# Patient Record
Sex: FEMALE | Race: OTHER | HISPANIC OR LATINO | Employment: UNEMPLOYED | URBAN - METROPOLITAN AREA
[De-identification: names, ages, dates, MRNs, and addresses within clinical notes are randomized per-mention and may not be internally consistent; named-entity substitution may affect disease eponyms.]

---

## 2021-08-11 ENCOUNTER — OFFICE VISIT (OUTPATIENT)
Dept: CARDIOLOGY CLINIC | Facility: CLINIC | Age: 61
End: 2021-08-11
Payer: COMMERCIAL

## 2021-08-11 VITALS
SYSTOLIC BLOOD PRESSURE: 124 MMHG | WEIGHT: 127 LBS | DIASTOLIC BLOOD PRESSURE: 60 MMHG | HEART RATE: 70 BPM | BODY MASS INDEX: 23.37 KG/M2 | HEIGHT: 62 IN

## 2021-08-11 DIAGNOSIS — M25.512 LEFT SHOULDER PAIN, UNSPECIFIED CHRONICITY: ICD-10-CM

## 2021-08-11 DIAGNOSIS — E78.5 DYSLIPIDEMIA: ICD-10-CM

## 2021-08-11 DIAGNOSIS — E11.9 TYPE 2 DIABETES MELLITUS WITHOUT COMPLICATION, WITHOUT LONG-TERM CURRENT USE OF INSULIN (HCC): ICD-10-CM

## 2021-08-11 DIAGNOSIS — I25.10 CORONARY ARTERY DISEASE INVOLVING NATIVE CORONARY ARTERY OF NATIVE HEART WITHOUT ANGINA PECTORIS: Primary | ICD-10-CM

## 2021-08-11 DIAGNOSIS — I10 ESSENTIAL HYPERTENSION, BENIGN: ICD-10-CM

## 2021-08-11 PROCEDURE — 93000 ELECTROCARDIOGRAM COMPLETE: CPT | Performed by: INTERNAL MEDICINE

## 2021-08-11 PROCEDURE — 99244 OFF/OP CNSLTJ NEW/EST MOD 40: CPT | Performed by: INTERNAL MEDICINE

## 2021-08-11 RX ORDER — LISINOPRIL 5 MG/1
5 TABLET ORAL DAILY
COMMUNITY

## 2021-08-11 RX ORDER — METOPROLOL SUCCINATE 25 MG/1
25 TABLET, EXTENDED RELEASE ORAL DAILY
COMMUNITY

## 2021-08-11 RX ORDER — LEVOTHYROXINE SODIUM 0.03 MG/1
25 TABLET ORAL DAILY
COMMUNITY

## 2021-08-11 RX ORDER — METHOCARBAMOL 500 MG/1
500 TABLET, FILM COATED ORAL 4 TIMES DAILY PRN
COMMUNITY
Start: 2021-05-10

## 2021-08-11 RX ORDER — OMEPRAZOLE 20 MG/1
20 CAPSULE, DELAYED RELEASE ORAL DAILY
COMMUNITY

## 2021-08-11 RX ORDER — GEMFIBROZIL 600 MG/1
600 TABLET, FILM COATED ORAL 2 TIMES DAILY
COMMUNITY
End: 2021-09-28

## 2021-08-11 RX ORDER — ASPIRIN 81 MG/1
81 TABLET ORAL DAILY
COMMUNITY

## 2021-08-11 RX ORDER — CLOPIDOGREL BISULFATE 75 MG/1
75 TABLET ORAL DAILY
COMMUNITY

## 2021-08-11 RX ORDER — GABAPENTIN 400 MG/1
400 CAPSULE ORAL 3 TIMES DAILY
COMMUNITY

## 2021-08-11 RX ORDER — SIMVASTATIN 40 MG
40 TABLET ORAL
COMMUNITY
End: 2021-09-28

## 2021-08-11 RX ORDER — ISOSORBIDE DINITRATE 30 MG/1
30 TABLET ORAL 3 TIMES DAILY
COMMUNITY

## 2021-08-11 RX ORDER — INSULIN LISPRO 100 [IU]/ML
10 INJECTION, SOLUTION INTRAVENOUS; SUBCUTANEOUS
COMMUNITY

## 2021-08-11 NOTE — PROGRESS NOTES
Cardiology Office Note  MD Pita Ba MD Frazier Merle, DO, [facility] MD Ben Soto DO, Ezekiel Chambers DO, [facility] [place]  ----------------------------------------------------------------  [address]  [address], [address]    Shabbir Given 61 y o  female MRN: [id number]  Unit/Bed#:  Encounter: [id number]      History of Present Illness: It was a pleasure to see Shabbir Irizarry in the office today for initial CV evaluation  She has a past medical history CAD with prior MI in [facility] and multiple stents in 2011, 2014 in 2017, dyslipidemia and type 2 diabetes mellitus  She established care with us in August 2021  She has prior myocardial infarction that occurred while she was in [facility]   At that time, she has had stents placed  Over the years, she has had several other stents placed  She states that she initially had symptoms of discomfort in her left chest wall radiating down her left arm  The symptoms were fairly significant and she was taken to the emergency department in [facility] where she was found to have myocardial infarction of the circumflex  Following the as initial stents that were placed, she has also had additional stents placed to the LAD with recurrent stents placed to the circumflex  She is now here today to establish care number reports that she is asymptomatic  She does admit to some left shoulder pain  She is unclear as to whether not this was similar pain to what she has had in the past   Denies lightheadedness, dizziness or palpitations  Denies lower extremity swelling, orthopnea paroxysmal nocturnal dyspnea  Review of Systems:  Review of Systems   Constitutional: Negative for decreased appetite, fever, weight gain and weight loss  HENT: Negative for congestion and sore throat  Eyes: Negative for visual disturbance  Cardiovascular: Positive for chest pain   Negative for dyspnea on exertion, leg swelling, near-syncope and palpitations  Respiratory: Negative for cough and shortness of breath  Hematologic/Lymphatic: Negative for bleeding problem  Skin: Negative for rash  Musculoskeletal: Negative for myalgias and neck pain  Gastrointestinal: Negative for abdominal pain and nausea  Neurological: Negative for light-headedness and weakness  Psychiatric/Behavioral: Negative for depression  Past Medical History:   Diagnosis Date    Coronary artery disease involving native coronary artery of native heart without angina pectoris     Dyslipidemia     Essential hypertension     MI (myocardial infarction) ([place] [address] )     Type 2 diabetes mellitus ([facility] )        Past Surgical History:   Procedure Laterality Date    CARDIAC CATHETERIZATION Left     Multiple catheterizations in , 2014 in 2017 in [facility] History     Socioeconomic History    Marital status: Single     Spouse name: None    Number of children: None    Years of education: None    Highest education level: None   Occupational History    None   Tobacco Use    Smoking status: Former Smoker     Types: Cigarettes     Quit date: 2001     Years since quittin 0    Smokeless tobacco: Never Used   Vaping Use    Vaping Use: Never used   Substance and Sexual Activity    Alcohol use: Never    Drug use: Never    Sexual activity: None   Other Topics Concern    None   Social History Narrative    None     Social Determinants of Health     Financial Resource Strain:     Difficulty of Paying Living Expenses:    Food Insecurity:     Worried About Running Out of Food in the Last Year:     [address] in the Last Year:    Transportation Needs:     Lack of Transportation (Medical):      Lack of Transportation (Non-Medical):    Physical Activity:     Days of Exercise per Week:     Minutes of Exercise per Session:    Stress:     Feeling of Stress :    Social Connections:     Frequency of Communication with Friends and Family:     Frequency of Social Gatherings with Friends and Family:     Attends [employer] Services:     Active Member of Clubs or Organizations:     Attends Club or Organization Meetings:     Marital Status:    Intimate Partner Violence:     Fear of Current or Ex-Partner:     Emotionally Abused:     Physically Abused:     Sexually Abused:        History reviewed  No pertinent family history      No Known Allergies      Current Outpatient Medications:     aspirin (ECOTRIN LOW STRENGTH) 81 mg EC tablet, Take 81 mg by mouth daily, Disp: , Rfl:     clopidogrel (PLAVIX) 75 mg tablet, Take 75 mg by mouth daily, Disp: , Rfl:     gabapentin (NEURONTIN) 400 mg capsule, Take 400 mg by mouth Three times a day, Disp: , Rfl:     gemfibrozil (LOPID) 600 mg tablet, Take 600 mg by mouth 2 (two) times a day, Disp: , Rfl:     insulin glargine (LANTUS SOLOSTAR) 100 units/mL injection pen, Inject 32 Units under the skin daily, Disp: , Rfl:     insulin lispro (HumaLOG) 100 units/mL injection pen, Inject under the skin, Disp: , Rfl:     isosorbide dinitrate (ISORDIL) 30 mg tablet, Take 30 mg by mouth Three times a day, Disp: , Rfl:     levothyroxine 25 mcg tablet, Take 25 mcg by mouth daily, Disp: , Rfl:     lisinopril (ZESTRIL) 5 mg tablet, Take 5 mg by mouth daily, Disp: , Rfl:     methocarbamol (ROBAXIN) 500 mg tablet, Take 500 mg by mouth 4 (four) times a day as needed, Disp: , Rfl:     metoprolol succinate (TOPROL-XL) 25 mg 24 hr tablet, Take 25 mg by mouth daily, Disp: , Rfl:     omeprazole (PriLOSEC) 20 mg delayed release capsule, Take 20 mg by mouth daily, Disp: , Rfl:     simvastatin (ZOCOR) 40 mg tablet, Take 40 mg by mouth, Disp: , Rfl:     Vitals:    08/11/21 1029   BP: 124/60   Pulse: 70   Weight: 57 6 kg (127 lb)   Height: 5' 2" (1 575 m)       PHYSICAL EXAMINATION:  Gen: Awake, Alert, NAD   Head/eyes: AT/NC, pupils equal and round, Anicteric  ENT: mmm  Neck: Supple, No elevated JVP, trachea midline  Resp: CTA bilaterally no w/r/r  CV: RRR +S1, S2, No m/r/g  Abd: Soft, NT/ND + BS  Ext: no LE edema bilaterally  Neuro: Follows commands, moves all extermities  Psych: Appropriate affect, normal mood, pleasant attitude, non-combative  Skin: warm; no rash, erythema or venous stasis changes on exposed skin    --------------------------------------------------------------------------------  TREADMILL STRESS  No results found for this or any previous visit      --------------------------------------------------------------------------------  NUCLEAR STRESS TEST: No results found for this or any previous visit  No results found for this or any previous visit       --------------------------------------------------------------------------------  CATH:  No results found for this or any previous visit     --------------------------------------------------------------------------------  ECHO:   No results found for this or any previous visit  No results found for this or any previous visit     --------------------------------------------------------------------------------  HOLTER  No results found for this or any previous visit      No results found for this or any previous visit     --------------------------------------------------------------------------------  CAROTIDS  No results found for this or any previous visit      --------------------------------------------------------------------------------  ECGs:  Results for orders placed or performed in visit on 08/11/21   POCT ECG    Impression    Sinus rhythm 70 beats per minute, normal ECG        No results found for: WBC, HGB, HCT, MCV, PLT   No results found for: SODIUM, K, CL, CO2, BUN, CREATININE, GLUC, CALCIUM   No results found for: HGBA1C   No results found for: CHOL  No results found for: HDL  No results found for: LDLCALC  No results found for: TRIG  No results found for: CHOLHDL   No results found for: INR, PROTIME 1  Coronary artery disease involving native coronary artery of native heart without angina pectoris  -     POCT ECG  -     Echo complete with contrast if indicated; Future; Expected date: 08/11/2021  -     NM myocardial perfusion spect (stress and/or rest); Future; Expected date: 08/11/2021  -     Lipid Panel with Direct LDL reflex; Future    2  Left shoulder pain, unspecified chronicity    3  Essential hypertension, benign  -     Echo complete with contrast if indicated; Future; Expected date: 08/11/2021    4  Dyslipidemia    5  Type 2 diabetes mellitus without complication, without long-term current use of insulin ([facility] )        IMPRESSION:  · Left shoulder/ left upper chest discomfort  · CAD/MI in [place]  · ZAC-mLCx, dLCx and mLAD, June 2011  · ZAC-dLCx, February 2014  · ZAC-pLAD, October 2017  · ZAC-LCx, November 2017  · Hypertension   · Dyslipidemia  · Type 2 diabetes mellitus    PLAN:  It was a pleasure to see Nando Rico in the office today for initial CV evaluation  She is here today to establish care with her known history of MI and CAD and multiple stents  She has had multiple stents to both her LAD and circumflex over many years  She admits to some left shoulder discomfort which I believe to be musculoskeletal in nature  She does state that there was some overlap between her prior discomfort, but previously there was some exertional component  She has no signs or symptoms of heart failure and examines to be euvolemic  ECG is nonischemic  Blood pressure and heart rate are currently stable  Based on her clinical presentation, I have the following recommendations:    1  Recommend checking exercise nuclear stress test to assess for any evidence of underlying myocardial ischemia  I have instructed the patient to hold her metoprolol on the day of testing  2  We will obtain a 2D echocardiogram to assess her cardiac structure and function with longstanding history of hypertension and CAD  3   Will attempt to obtain records from [place]   Patient has records at home and will bring the min at our follow-up encounter  4  Check lipid panel  Goal LDL is less than 70 mg/dL  She is currently on Zocor  Plan to change to atorvastatin at our next encounter  Continue gemfibrozil  5  Continue lifelong aspirin would maintain on Plavix for now, but will consider changing to Xarelto 2 5 mg b i d  Long-term  6  Continue metoprolol, lisinopril and isosorbide for blood pressure control  7  Should her symptoms worsen in frequency or severity or change in quality, I recommend she seek immediate medical attention  Recommend heart healthy diet low in sodium  8  We will follow up with her after testing  As always, please do not hesitate to call with any questions  Portions of the record may have been created with voice recognition software  Occasional wrong word or "sound a like" substitutions may have occurred due to the inherent limitations of voice recognition software  Read the chart carefully and recognize, using context, where substitutions have occurred        Signed: Vicky King DO, [facility]

## 2021-09-11 ENCOUNTER — LAB (OUTPATIENT)
Dept: LAB | Facility: HOSPITAL | Age: 61
End: 2021-09-11
Attending: INTERNAL MEDICINE
Payer: COMMERCIAL

## 2021-09-11 DIAGNOSIS — I25.10 CORONARY ARTERY DISEASE INVOLVING NATIVE CORONARY ARTERY OF NATIVE HEART WITHOUT ANGINA PECTORIS: ICD-10-CM

## 2021-09-11 LAB
CHOLEST SERPL-MCNC: 219 MG/DL (ref 50–200)
HDLC SERPL-MCNC: 55 MG/DL
LDLC SERPL CALC-MCNC: 144 MG/DL (ref 0–100)
TRIGL SERPL-MCNC: 100 MG/DL

## 2021-09-11 PROCEDURE — 80061 LIPID PANEL: CPT

## 2021-09-11 PROCEDURE — 36415 COLL VENOUS BLD VENIPUNCTURE: CPT

## 2021-09-16 ENCOUNTER — HOSPITAL ENCOUNTER (OUTPATIENT)
Dept: NON INVASIVE DIAGNOSTICS | Facility: HOSPITAL | Age: 61
Discharge: HOME | End: 2021-09-16
Attending: INTERNAL MEDICINE
Payer: COMMERCIAL

## 2021-09-16 ENCOUNTER — HOSPITAL ENCOUNTER (OUTPATIENT)
Dept: NUCLEAR MEDICINE | Facility: HOSPITAL | Age: 61
Discharge: HOME | End: 2021-09-16
Attending: INTERNAL MEDICINE
Payer: COMMERCIAL

## 2021-09-16 DIAGNOSIS — I10 ESSENTIAL HYPERTENSION, BENIGN: ICD-10-CM

## 2021-09-16 DIAGNOSIS — I25.10 CORONARY ARTERY DISEASE INVOLVING NATIVE CORONARY ARTERY OF NATIVE HEART WITHOUT ANGINA PECTORIS: ICD-10-CM

## 2021-09-16 PROCEDURE — 93017 CV STRESS TEST TRACING ONLY: CPT

## 2021-09-16 PROCEDURE — 93306 TTE W/DOPPLER COMPLETE: CPT

## 2021-09-16 PROCEDURE — 93016 CV STRESS TEST SUPVJ ONLY: CPT

## 2021-09-16 PROCEDURE — 93018 CV STRESS TEST I&R ONLY: CPT

## 2021-09-16 PROCEDURE — A9502 TC99M TETROFOSMIN: HCPCS

## 2021-09-16 PROCEDURE — 78452 HT MUSCLE IMAGE SPECT MULT: CPT

## 2021-09-16 PROCEDURE — 93306 TTE W/DOPPLER COMPLETE: CPT | Performed by: INTERNAL MEDICINE

## 2021-09-16 RX ADMIN — REGADENOSON 0.4 MG: 0.08 INJECTION, SOLUTION INTRAVENOUS at 10:55

## 2021-09-17 LAB
CHEST PAIN STATEMENT: NORMAL
MAX DIASTOLIC BP: 80 MMHG
MAX HEART RATE: 118 BPM
MAX PREDICTED HEART RATE: 160 BPM
MAX. SYSTOLIC BP: 140 MMHG
PROTOCOL NAME: NORMAL
REASON FOR TERMINATION: NORMAL
TARGET HR FORMULA: NORMAL
TIME IN EXERCISE PHASE: NORMAL

## 2021-09-18 ENCOUNTER — TELEPHONE (OUTPATIENT)
Dept: NON INVASIVE DIAGNOSTICS | Facility: HOSPITAL | Age: 61
End: 2021-09-18

## 2021-09-18 NOTE — TELEPHONE ENCOUNTER
Cardiology Note:  Patient has had abnormal stress test and has been asymptomatic      Instructed to not significantly exert herself   Has office appointment in just over week   Patient understands that should she have any significant symptoms, she should call  seek immediate medical attention/ dial 911  Continue with optimal medical therapy including aspirin, Plavix, beta-blocker and isosorbide  If there are any openings, will try to move the patient up

## 2021-09-28 ENCOUNTER — OFFICE VISIT (OUTPATIENT)
Dept: CARDIOLOGY CLINIC | Facility: CLINIC | Age: 61
End: 2021-09-28
Payer: COMMERCIAL

## 2021-09-28 VITALS — HEART RATE: 89 BPM | SYSTOLIC BLOOD PRESSURE: 118 MMHG | DIASTOLIC BLOOD PRESSURE: 60 MMHG

## 2021-09-28 DIAGNOSIS — R94.39 ABNORMAL STRESS TEST: ICD-10-CM

## 2021-09-28 DIAGNOSIS — E78.5 DYSLIPIDEMIA: ICD-10-CM

## 2021-09-28 DIAGNOSIS — I10 ESSENTIAL HYPERTENSION, BENIGN: ICD-10-CM

## 2021-09-28 DIAGNOSIS — I25.10 CORONARY ARTERY DISEASE INVOLVING NATIVE CORONARY ARTERY OF NATIVE HEART WITHOUT ANGINA PECTORIS: Primary | ICD-10-CM

## 2021-09-28 DIAGNOSIS — E11.9 TYPE 2 DIABETES MELLITUS WITHOUT COMPLICATION, WITHOUT LONG-TERM CURRENT USE OF INSULIN (HCC): ICD-10-CM

## 2021-09-28 PROCEDURE — 99214 OFFICE O/P EST MOD 30 MIN: CPT | Performed by: INTERNAL MEDICINE

## 2021-09-28 RX ORDER — ATORVASTATIN CALCIUM 80 MG/1
80 TABLET, FILM COATED ORAL DAILY
Qty: 90 TABLET | Refills: 1 | Status: SHIPPED | OUTPATIENT
Start: 2021-09-28 | End: 2022-03-18

## 2021-09-28 RX ORDER — FENOFIBRATE 145 MG/1
145 TABLET, COATED ORAL DAILY
Qty: 90 TABLET | Refills: 1 | Status: SHIPPED | OUTPATIENT
Start: 2021-09-28 | End: 2022-03-18

## 2021-09-28 NOTE — PROGRESS NOTES
Cardiology Office Note  MD Jennifer Marcus MD Elissa Pearl, DO, Dovie Primrose Mansoor, MD Marinus Helper, DO, Petrina Morita, DO, [facility] [place]  ----------------------------------------------------------------  [facility]  [address], [address]     Nidhi Cabrera 61 y o  female MRN: [id number]  Unit/Bed#:  Encounter: [id number]      History of Present Illness: It was a pleasure to see Nidhi Cabrera in the office today for follow up CV evaluation  She has a past medical history CAD with prior MI in [facility] and multiple stents in 2011, 2014 in 2017, dyslipidemia and type 2 diabetes mellitus  She established care with us in August 2021  She has prior myocardial infarction that occurred while she was in [facility]   At that time, she has had stents placed  Over the years, she has had several other stents placed  She states that she initially had symptoms of discomfort in her left chest wall radiating down her left arm  The symptoms were fairly significant and she was taken to the emergency department in [facility] where she was found to have myocardial infarction of the circumflex  Following the as initial stents that were placed, she has also had additional stents placed to the LAD with recurrent stents placed to the circumflex  She presented to our office to both establish care and also because she was having left shoulder pain as well as some exertional symptoms  She had admitted to some more fatigue and dyspnea on exertion  The symptoms have been similar to her anginal equivalent  She was not readily admitting to the shortness of breath and exertional symptoms at 1st, but has now been admitting to them to family  These are similar to her prior angina  She denies any lower extremity swelling, orthopnea or paroxysmal nocturnal dyspnea  Denies lightheadedness, dizziness or palpitations    Due to her initial symptoms of shoulder pain, she was sent for stress test and echocardiogram   She is here today discuss the results  Review of Systems:  Review of Systems   Constitutional: Negative for decreased appetite, fever, weight gain and weight loss  HENT: Negative for congestion and sore throat  Eyes: Negative for visual disturbance  Cardiovascular: Positive for chest pain and dyspnea on exertion  Negative for leg swelling, near-syncope and palpitations  Respiratory: Positive for shortness of breath  Negative for cough  Hematologic/Lymphatic: Negative for bleeding problem  Skin: Negative for rash  Musculoskeletal: Negative for myalgias and neck pain  Gastrointestinal: Negative for abdominal pain and nausea  Neurological: Negative for light-headedness and weakness  Psychiatric/Behavioral: Negative for depression         Past Medical History:   Diagnosis Date    Coronary artery disease involving native coronary artery of native heart without angina pectoris     Dyslipidemia     Essential hypertension     MI [facility] infarction) ([facility] )     Type 2 diabetes mellitus ([facility] )        Past Surgical History:   Procedure Laterality Date    CARDIAC CATHETERIZATION Left     Multiple catheterizations in , 2014 in 2017 in [facility] History     Socioeconomic History    Marital status: Single     Spouse name: None    Number of children: None    Years of education: None    Highest education level: None   Occupational History    None   Tobacco Use    Smoking status: Former Smoker     Types: Cigarettes     Quit date: 2001     Years since quittin 1    Smokeless tobacco: Never Used   Vaping Use    Vaping Use: Never used   Substance and Sexual Activity    Alcohol use: Never    Drug use: Never    Sexual activity: None   Other Topics Concern    None   Social History Narrative    None     Social Determinants of Health     Financial Resource Strain:     Difficulty of Paying Living Expenses:    Food Insecurity:     Worried About Running Out of Food in the Last Year:     [address] in the Last Year:    Transportation Needs:     Lack of Transportation (Medical):  Lack of Transportation (Non-Medical):    Physical Activity:     Days of Exercise per Week:     Minutes of Exercise per Session:    Stress:     Feeling of Stress :    Social Connections:     Frequency of Communication with Friends and Family:     Frequency of Social Gatherings with Friends and Family:     Attends [employer]od[employer] Services:     Active Member of Clubs or Organizations:     Attends Club or Organization Meetings:     Marital Status:    Intimate Partner Violence:     Fear of Current or Ex-Partner:     Emotionally Abused:     Physically Abused:     Sexually Abused:        History reviewed  No pertinent family history      No Known Allergies      Current Outpatient Medications:     aspirin (ECOTRIN LOW STRENGTH) 81 mg EC tablet, Take 81 mg by mouth daily, Disp: , Rfl:     clopidogrel (PLAVIX) 75 mg tablet, Take 75 mg by mouth daily, Disp: , Rfl:     gabapentin (NEURONTIN) 400 mg capsule, Take 400 mg by mouth Three times a day, Disp: , Rfl:     insulin glargine (LANTUS SOLOSTAR) 100 units/mL injection pen, Inject 32 Units under the skin daily, Disp: , Rfl:     insulin lispro (HumaLOG) 100 units/mL injection pen, Inject under the skin, Disp: , Rfl:     isosorbide dinitrate (ISORDIL) 30 mg tablet, Take 30 mg by mouth Three times a day, Disp: , Rfl:     levothyroxine 25 mcg tablet, Take 25 mcg by mouth daily, Disp: , Rfl:     lisinopril (ZESTRIL) 5 mg tablet, Take 5 mg by mouth daily, Disp: , Rfl:     methocarbamol (ROBAXIN) 500 mg tablet, Take 500 mg by mouth 4 (four) times a day as needed, Disp: , Rfl:     metoprolol succinate (TOPROL-XL) 25 mg 24 hr tablet, Take 25 mg by mouth daily, Disp: , Rfl:     atorvastatin (LIPITOR) 80 mg tablet, Take 1 tablet (80 mg total) by mouth daily, Disp: 90 tablet, Rfl: 1    fenofibrate (TRICOR) 145 mg tablet, Take 1 tablet (145 mg total) by mouth daily, Disp: 90 tablet, Rfl: 1    omeprazole (PriLOSEC) 20 mg delayed release capsule, Take 20 mg by mouth daily (Patient not taking: Reported on 9/28/2021), Disp: , Rfl:     Vitals:    09/28/21 0928   BP: 118/60   Pulse: 89       PHYSICAL EXAMINATION:  Gen: Awake, Alert, NAD   Head/eyes: AT/NC, pupils equal and round, Anicteric  ENT: mmm  Neck: Supple, No elevated JVP, trachea midline  Resp: CTA bilaterally no w/r/r  CV: RRR +S1, S2, No m/r/g  Abd: Soft, NT/ND + BS  Ext: no LE edema bilaterally  Neuro: Follows commands, moves all extermities  Psych: Appropriate affect, normal mood, pleasant attitude, non-combative  Skin: warm; no rash, erythema or venous stasis changes on exposed skin    --------------------------------------------------------------------------------  TREADMILL STRESS  No results found for this or any previous visit      --------------------------------------------------------------------------------  NUCLEAR STRESS TEST: No results found for this or any previous visit  No results found for this or any previous visit       --------------------------------------------------------------------------------  CATH:  No results found for this or any previous visit     --------------------------------------------------------------------------------  ECHO:   No results found for this or any previous visit  No results found for this or any previous visit     --------------------------------------------------------------------------------  HOLTER  No results found for this or any previous visit  No results found for this or any previous visit     --------------------------------------------------------------------------------  CAROTIDS  No results found for this or any previous visit      --------------------------------------------------------------------------------  ECGs:  No results found for this visit on 09/28/21  No results found for: WBC, HGB, HCT, MCV, PLT   No results found for: SODIUM, K, CL, CO2, BUN, CREATININE, GLUC, CALCIUM   No results found for: HGBA1C   No results found for: CHOL  Lab Results   Component Value Date    HDL 55 09/11/2021     Lab Results   Component Value Date    LDLCALC 144 (H) 09/11/2021     Lab Results   Component Value Date    TRIG 100 09/11/2021     No results found for: CHOLHDL   No results found for: INR, PROTIME     1  Coronary artery disease involving native coronary artery of native heart without angina pectoris  -     CARDIAC CATHETERIZATION (order if scheduling before 10/11/21); Future; Expected date: 09/28/2021  -     Basic metabolic panel  -     CBC and differential  -     Protime-INR; Future  -     APTT    2  Abnormal stress test  -     CARDIAC CATHETERIZATION (order if scheduling before 10/11/21); Future; Expected date: 09/28/2021    3  Essential hypertension, benign    4  Dyslipidemia  -     atorvastatin (LIPITOR) 80 mg tablet; Take 1 tablet (80 mg total) by mouth daily  -     fenofibrate (TRICOR) 145 mg tablet; Take 1 tablet (145 mg total) by mouth daily    5  Type 2 diabetes mellitus without complication, without long-term current use of insulin ([facility])        IMPRESSION:  · Left shoulder/ left upper chest discomfort  · Abnormal pharmacologic nuclear stress test with small apical/anteroapical ischemia, gated EF 65%, September 2021  · CAD/MI in [facility]  ? ZAC-mLCx, dLCx and mLAD, June 2011  ? ZAC-dLCx, February 2014  ? ZAC-pLAD, October 2017  ? ZAC-LCx, November 2017  · LVEF 55%, mild MAC, trace MR/TR, September 2021  · Hypertension   · Dyslipidemia w/  mg/dL, September 2021  · Type 2 diabetes mellitus    PLAN:  It was a pleasure to see Des Azar in the office today for follow up CV evaluation  She is here today for follow-up after her abnormal stress test findings  The stress test was found to be positive for myocardial ischemia at the apex and distal anterior wall  She now admits to symptoms similar to her prior anginal equivalent  She has been experiencing them, but she has had reluctance to undergo cardiac catheterization  She has no signs or symptoms of heart failure and examines to be euvolemic  Her cholesterol was found to be elevated 144 mg/dL  Prior ECG was nonischemic  Blood pressure and heart rate are currently stable  Based on her clinical presentation, I have the following recommendations:    1  Due to her symptoms similar her prior anginal equivalent and extensive history with multiple stents, I have recommended cardiac catheterization  Risks, benefits and alternatives to cardiac catheterization have been discussed at length including the risk of bleeding, infection, renal failure, CVA, myocardial infarction and death; patient understands these risks and wishes to proceed  Blood work has been ordered  2  LDL cholesterol was elevated  Will discontinue her simvastatin and start atorvastatin 80 mg daily  3  Would discontinue gemfibrozil and start fenofibrate to decrease the likelihood of interactions with the atorvastatin  Risks and benefits of both medications discussed  4  Continue lifelong aspirin  Continue Plavix  5  Continue current antihypertensive regimen including metoprolol, lisinopril and isosorbide  Patient is on multiple antianginal medications  6  Should her symptoms worsen in frequency or severity or change in quality, I would recommend she seek immediate medical attention  7  We will follow up with her after testing  As always, please do not hesitate to call with any questions  Portions of the record may have been created with voice recognition software  Occasional wrong word or "sound a like" substitutions may have occurred due to the inherent limitations of voice recognition software  Read the chart carefully and recognize, using context, where substitutions have occurred        Signed: Denese Homans, DO, [place]

## 2021-09-29 ENCOUNTER — TELEPHONE (OUTPATIENT)
Dept: CARDIOLOGY CLINIC | Facility: CLINIC | Age: 61
End: 2021-09-29

## 2021-09-29 NOTE — TELEPHONE ENCOUNTER
Mrs Desean Kline sent fax requesting a prior authorization for the atorvastatin  This was completed through [facility]  KEY: [id number]   Waiting On Determination

## 2021-09-30 ENCOUNTER — PREP FOR PROCEDURE (OUTPATIENT)
Dept: CARDIOLOGY CLINIC | Facility: CLINIC | Age: 61
End: 2021-09-30

## 2021-09-30 DIAGNOSIS — I25.10 ATHEROSCLEROSIS OF NATIVE CORONARY ARTERY OF NATIVE HEART WITHOUT ANGINA PECTORIS: Primary | ICD-10-CM

## 2021-10-02 ENCOUNTER — APPOINTMENT (OUTPATIENT)
Dept: LAB | Facility: HOSPITAL | Age: 61
End: 2021-10-02
Attending: INTERNAL MEDICINE
Payer: COMMERCIAL

## 2021-10-02 DIAGNOSIS — I25.10 CORONARY ARTERY DISEASE INVOLVING NATIVE CORONARY ARTERY OF NATIVE HEART WITHOUT ANGINA PECTORIS: ICD-10-CM

## 2021-10-02 LAB
ANION GAP SERPL CALCULATED.3IONS-SCNC: 7 MMOL/L (ref 4–13)
APTT PPP: 26 SECONDS (ref 23–37)
BASOPHILS # BLD AUTO: 0.04 THOUSANDS/ΜL (ref 0–0.1)
BASOPHILS NFR BLD AUTO: 1 % (ref 0–1)
BUN SERPL-MCNC: 18 MG/DL (ref 5–25)
CALCIUM SERPL-MCNC: 9.5 MG/DL (ref 8.3–10.1)
CHLORIDE SERPL-SCNC: 106 MMOL/L (ref 100–108)
CO2 SERPL-SCNC: 30 MMOL/L (ref 21–32)
CREAT SERPL-MCNC: 1.18 MG/DL (ref 0.6–1.3)
EOSINOPHIL # BLD AUTO: 0.13 THOUSAND/ΜL (ref 0–0.61)
EOSINOPHIL NFR BLD AUTO: 3 % (ref 0–6)
ERYTHROCYTE [DISTWIDTH] IN BLOOD BY AUTOMATED COUNT: 11.9 % (ref 11.6–15.1)
GFR SERPL CREATININE-BSD FRML MDRD: 50 ML/MIN/1.73SQ M
GLUCOSE P FAST SERPL-MCNC: 193 MG/DL (ref 65–99)
HCT VFR BLD AUTO: 35 % (ref 34.8–46.1)
HGB BLD-MCNC: 11.1 G/DL (ref 11.5–15.4)
IMM GRANULOCYTES # BLD AUTO: 0.01 THOUSAND/UL (ref 0–0.2)
IMM GRANULOCYTES NFR BLD AUTO: 0 % (ref 0–2)
INR PPP: 1.23 (ref 0.84–1.19)
LYMPHOCYTES # BLD AUTO: 1.99 THOUSANDS/ΜL (ref 0.6–4.47)
LYMPHOCYTES NFR BLD AUTO: 47 % (ref 14–44)
MCH RBC QN AUTO: 29.2 PG (ref 26.8–34.3)
MCHC RBC AUTO-ENTMCNC: 31.7 G/DL (ref 31.4–37.4)
MCV RBC AUTO: 92 FL (ref 82–98)
MONOCYTES # BLD AUTO: 0.31 THOUSAND/ΜL (ref 0.17–1.22)
MONOCYTES NFR BLD AUTO: 7 % (ref 4–12)
NEUTROPHILS # BLD AUTO: 1.75 THOUSANDS/ΜL (ref 1.85–7.62)
NEUTS SEG NFR BLD AUTO: 42 % (ref 43–75)
NRBC BLD AUTO-RTO: 0 /100 WBCS
PLATELET # BLD AUTO: 284 THOUSANDS/UL (ref 149–390)
PMV BLD AUTO: 9.7 FL (ref 8.9–12.7)
POTASSIUM SERPL-SCNC: 4.7 MMOL/L (ref 3.5–5.3)
PROTHROMBIN TIME: 15.1 SECONDS (ref 11.6–14.5)
RBC # BLD AUTO: 3.8 MILLION/UL (ref 3.81–5.12)
SODIUM SERPL-SCNC: 143 MMOL/L (ref 136–145)
WBC # BLD AUTO: 4.23 THOUSAND/UL (ref 4.31–10.16)

## 2021-10-02 PROCEDURE — 36415 COLL VENOUS BLD VENIPUNCTURE: CPT | Performed by: INTERNAL MEDICINE

## 2021-10-02 PROCEDURE — 85730 THROMBOPLASTIN TIME PARTIAL: CPT | Performed by: INTERNAL MEDICINE

## 2021-10-02 PROCEDURE — 85610 PROTHROMBIN TIME: CPT

## 2021-10-02 PROCEDURE — 85025 COMPLETE CBC W/AUTO DIFF WBC: CPT | Performed by: INTERNAL MEDICINE

## 2021-10-02 PROCEDURE — 80048 BASIC METABOLIC PNL TOTAL CA: CPT | Performed by: INTERNAL MEDICINE

## 2021-10-05 ENCOUNTER — TELEPHONE (OUTPATIENT)
Dept: CARDIOLOGY CLINIC | Facility: CLINIC | Age: 61
End: 2021-10-05

## 2021-10-18 ENCOUNTER — HOSPITAL ENCOUNTER (OUTPATIENT)
Facility: HOSPITAL | Age: 61
Setting detail: OUTPATIENT SURGERY
Discharge: HOME | End: 2021-10-18
Attending: INTERNAL MEDICINE | Admitting: INTERNAL MEDICINE
Payer: COMMERCIAL

## 2021-10-18 VITALS
BODY MASS INDEX: 23.37 KG/M2 | TEMPERATURE: 97.5 F | DIASTOLIC BLOOD PRESSURE: 67 MMHG | HEART RATE: 80 BPM | RESPIRATION RATE: 16 BRPM | WEIGHT: 127 LBS | HEIGHT: 62 IN | SYSTOLIC BLOOD PRESSURE: 117 MMHG | OXYGEN SATURATION: 98 %

## 2021-10-18 DIAGNOSIS — Z98.890 S/P CARDIAC CATHETERIZATION: Primary | ICD-10-CM

## 2021-10-18 DIAGNOSIS — I25.10 ATHEROSCLEROSIS OF NATIVE CORONARY ARTERY OF NATIVE HEART WITHOUT ANGINA PECTORIS: ICD-10-CM

## 2021-10-18 PROCEDURE — 93458 L HRT ARTERY/VENTRICLE ANGIO: CPT | Performed by: INTERNAL MEDICINE

## 2021-10-18 PROCEDURE — C1894 INTRO/SHEATH, NON-LASER: HCPCS | Performed by: INTERNAL MEDICINE

## 2021-10-18 PROCEDURE — C1769 GUIDE WIRE: HCPCS | Performed by: INTERNAL MEDICINE

## 2021-10-18 PROCEDURE — 99152 MOD SED SAME PHYS/QHP 5/>YRS: CPT | Performed by: INTERNAL MEDICINE

## 2021-10-18 PROCEDURE — C1887 CATHETER, GUIDING: HCPCS | Performed by: INTERNAL MEDICINE

## 2021-10-18 RX ORDER — LIDOCAINE HYDROCHLORIDE 10 MG/ML
INJECTION, SOLUTION EPIDURAL; INFILTRATION; INTRACAUDAL; PERINEURAL AS NEEDED
Status: DISCONTINUED | OUTPATIENT
Start: 2021-10-18 | End: 2021-10-18 | Stop reason: HOSPADM

## 2021-10-18 RX ORDER — HEPARIN SODIUM 1000 [USP'U]/ML
INJECTION, SOLUTION INTRAVENOUS; SUBCUTANEOUS AS NEEDED
Status: DISCONTINUED | OUTPATIENT
Start: 2021-10-18 | End: 2021-10-18 | Stop reason: HOSPADM

## 2021-10-18 RX ORDER — ASPIRIN 81 MG/1
324 TABLET, CHEWABLE ORAL ONCE
Status: COMPLETED | OUTPATIENT
Start: 2021-10-18 | End: 2021-10-18

## 2021-10-18 RX ORDER — MIDAZOLAM HYDROCHLORIDE 2 MG/2ML
INJECTION, SOLUTION INTRAMUSCULAR; INTRAVENOUS AS NEEDED
Status: DISCONTINUED | OUTPATIENT
Start: 2021-10-18 | End: 2021-10-18 | Stop reason: HOSPADM

## 2021-10-18 RX ORDER — FENTANYL CITRATE 50 UG/ML
INJECTION, SOLUTION INTRAMUSCULAR; INTRAVENOUS AS NEEDED
Status: DISCONTINUED | OUTPATIENT
Start: 2021-10-18 | End: 2021-10-18 | Stop reason: HOSPADM

## 2021-10-18 RX ORDER — NITROGLYCERIN 20 MG/100ML
INJECTION INTRAVENOUS AS NEEDED
Status: DISCONTINUED | OUTPATIENT
Start: 2021-10-18 | End: 2021-10-18 | Stop reason: HOSPADM

## 2021-10-18 RX ORDER — SODIUM CHLORIDE 9 MG/ML
100 INJECTION, SOLUTION INTRAVENOUS CONTINUOUS
Status: DISCONTINUED | OUTPATIENT
Start: 2021-10-18 | End: 2021-10-22 | Stop reason: HOSPADM

## 2021-10-18 RX ORDER — VERAPAMIL HYDROCHLORIDE 2.5 MG/ML
INJECTION, SOLUTION INTRAVENOUS AS NEEDED
Status: DISCONTINUED | OUTPATIENT
Start: 2021-10-18 | End: 2021-10-18 | Stop reason: HOSPADM

## 2021-10-18 RX ORDER — CLOPIDOGREL BISULFATE 75 MG/1
600 TABLET ORAL ONCE
Status: COMPLETED | OUTPATIENT
Start: 2021-10-18 | End: 2021-10-18

## 2021-10-18 RX ORDER — SODIUM CHLORIDE 9 MG/ML
100 INJECTION, SOLUTION INTRAVENOUS CONTINUOUS
Status: DISPENSED | OUTPATIENT
Start: 2021-10-18 | End: 2021-10-18

## 2021-10-18 RX ADMIN — CLOPIDOGREL BISULFATE 525 MG: 75 TABLET ORAL at 10:15

## 2021-10-18 RX ADMIN — ASPIRIN 81 MG CHEWABLE TABLET 243 MG: 81 TABLET CHEWABLE at 10:13

## 2021-10-18 RX ADMIN — SODIUM CHLORIDE 100 ML/HR: 0.9 INJECTION, SOLUTION INTRAVENOUS at 10:05

## 2021-11-03 ENCOUNTER — DOCUMENTATION (OUTPATIENT)
Dept: RHEUMATOLOGY | Facility: CLINIC | Age: 61
End: 2021-11-03

## 2021-11-08 ENCOUNTER — OFFICE VISIT (OUTPATIENT)
Dept: CARDIOLOGY CLINIC | Facility: CLINIC | Age: 61
End: 2021-11-08
Payer: COMMERCIAL

## 2021-11-08 VITALS
BODY MASS INDEX: 24.33 KG/M2 | SYSTOLIC BLOOD PRESSURE: 130 MMHG | DIASTOLIC BLOOD PRESSURE: 60 MMHG | WEIGHT: 132.2 LBS | OXYGEN SATURATION: 96 % | HEIGHT: 62 IN | HEART RATE: 82 BPM

## 2021-11-08 DIAGNOSIS — I73.9 CLAUDICATION (HCC): ICD-10-CM

## 2021-11-08 DIAGNOSIS — E78.5 DYSLIPIDEMIA: ICD-10-CM

## 2021-11-08 DIAGNOSIS — I10 ESSENTIAL HYPERTENSION, BENIGN: ICD-10-CM

## 2021-11-08 DIAGNOSIS — R94.39 ABNORMAL STRESS TEST: ICD-10-CM

## 2021-11-08 DIAGNOSIS — E11.9 TYPE 2 DIABETES MELLITUS WITHOUT COMPLICATION, WITHOUT LONG-TERM CURRENT USE OF INSULIN (HCC): ICD-10-CM

## 2021-11-08 DIAGNOSIS — I25.10 CORONARY ARTERY DISEASE INVOLVING NATIVE CORONARY ARTERY OF NATIVE HEART WITHOUT ANGINA PECTORIS: Primary | ICD-10-CM

## 2021-11-08 PROCEDURE — 99214 OFFICE O/P EST MOD 30 MIN: CPT | Performed by: INTERNAL MEDICINE

## 2021-12-04 ENCOUNTER — APPOINTMENT (OUTPATIENT)
Dept: LAB | Facility: HOSPITAL | Age: 61
End: 2021-12-04
Attending: INTERNAL MEDICINE
Payer: COMMERCIAL

## 2021-12-04 DIAGNOSIS — E78.5 DYSLIPIDEMIA: ICD-10-CM

## 2021-12-04 LAB
CHOLEST SERPL-MCNC: 188 MG/DL
HDLC SERPL-MCNC: 38 MG/DL
LDLC SERPL CALC-MCNC: 116 MG/DL (ref 0–100)
TRIGL SERPL-MCNC: 170 MG/DL

## 2021-12-04 PROCEDURE — 80061 LIPID PANEL: CPT

## 2021-12-04 PROCEDURE — 36415 COLL VENOUS BLD VENIPUNCTURE: CPT

## 2021-12-06 ENCOUNTER — HOSPITAL ENCOUNTER (OUTPATIENT)
Dept: NON INVASIVE DIAGNOSTICS | Facility: CLINIC | Age: 61
Discharge: HOME | End: 2021-12-06
Payer: COMMERCIAL

## 2021-12-06 DIAGNOSIS — I73.9 CLAUDICATION (HCC): ICD-10-CM

## 2021-12-06 PROCEDURE — 93923 UPR/LXTR ART STDY 3+ LVLS: CPT

## 2021-12-06 PROCEDURE — 93925 LOWER EXTREMITY STUDY: CPT | Performed by: SURGERY

## 2021-12-06 PROCEDURE — 93922 UPR/L XTREMITY ART 2 LEVELS: CPT | Performed by: SURGERY

## 2021-12-06 PROCEDURE — 93925 LOWER EXTREMITY STUDY: CPT

## 2021-12-15 ENCOUNTER — TELEPHONE (OUTPATIENT)
Dept: CARDIOLOGY CLINIC | Facility: CLINIC | Age: 61
End: 2021-12-15

## 2022-03-18 DIAGNOSIS — E78.5 DYSLIPIDEMIA: ICD-10-CM

## 2022-03-18 RX ORDER — ATORVASTATIN CALCIUM 80 MG/1
TABLET, FILM COATED ORAL
Qty: 90 TABLET | Refills: 1 | Status: SHIPPED | OUTPATIENT
Start: 2022-03-18

## 2022-03-18 RX ORDER — FENOFIBRATE 145 MG/1
TABLET, COATED ORAL
Qty: 90 TABLET | Refills: 1 | Status: SHIPPED | OUTPATIENT
Start: 2022-03-18

## 2022-03-31 ENCOUNTER — TELEPHONE (OUTPATIENT)
Dept: NEPHROLOGY | Facility: CLINIC | Age: 62
End: 2022-03-31

## 2022-03-31 NOTE — TELEPHONE ENCOUNTER
Patients son,Tejinder, called 03/31 asking to schedule a consult appointment  Tejinder stated that it was to check kidney functions and that he has a referral from PCP   I reached out to Matthew Méndez and left a message asking if they can please fax over a referral

## 2022-04-01 NOTE — TELEPHONE ENCOUNTER
Left second message with PCP office to please give us a call back to see if they can fax over a referral

## 2022-04-07 NOTE — TELEPHONE ENCOUNTER
Referral is in [facility]  I tried to call patients son, Dewey Corona, but it was just a dial tone

## 2022-04-15 ENCOUNTER — TELEPHONE (OUTPATIENT)
Dept: NEPHROLOGY | Facility: CLINIC | Age: 62
End: 2022-04-15

## 2022-04-15 NOTE — TELEPHONE ENCOUNTER
New Patient Intake Form   Patient Details   Fayne Spurling     1960     [id number]     Appointment Information   Who is calling to schedule? If not patient, what is callers name? [address] Sadie Ignacio   Referring Provider  PCP Dr Jerman Cobos   Referring Provider Number    Reason for Appt (Diagnosis) Renal function   Is patient aware of why they are being referred? yes   Does Patient have labs done at [facility]? If not, where do they go? yes / [facility]   Has patient had labs / urine work done? List date of most recent lab / urine work yes / [facility]   Has patient had a BMP & CBC done in the past 2 years? If so, list the date Unknown   Has patient been hospitalized recently? If yes, list name and location of hospital they were in no    Has patient been seen by a Nephrologist before? If yes, list name, location and phone number  no   Has patient been see by another Specialty before (ex  Neurology, urology, cardiology)? If yes, please list name, and specialty  cardiology   Has the patient had imaging done? If so, list the most recent date and type of imaging yes 03/2022 abdomen area   Does the patient has a stone analysis report if history of kidney stones?   no   Appointment Details   Is there a referral on file? no    Appointment Date  07/18/2022   [facility]   Scheduled with Dr Jennifer Covington

## 2022-07-15 ENCOUNTER — TELEPHONE (OUTPATIENT)
Dept: NEPHROLOGY | Facility: CLINIC | Age: 62
End: 2022-07-15

## 2022-07-15 NOTE — TELEPHONE ENCOUNTER
Appointment Confirmation   Person confirmed appointment with  If not patient, name of the person lm    Date and time of appointment 07/18   Patient acknowledged and will be at appointment? no   Did you advise the patient that they will need a urine sample if they are a new patient? no   Did you advise the patient to bring their current medications for verification? (including any OTC) no   Additional Information  lm to confirm

## 2022-07-18 ENCOUNTER — TELEPHONE (OUTPATIENT)
Dept: NEPHROLOGY | Facility: CLINIC | Age: 62
End: 2022-07-18

## 2022-08-31 ENCOUNTER — HOSPITAL ENCOUNTER (OUTPATIENT)
Dept: GASTROENTEROLOGY | Facility: HOSPITAL | Age: 62
Setting detail: OUTPATIENT SURGERY
Discharge: HOME | End: 2022-08-31
Attending: INTERNAL MEDICINE

## 2022-08-31 DIAGNOSIS — K21.9 GASTRO-ESOPHAGEAL REFLUX DISEASE WITHOUT ESOPHAGITIS: ICD-10-CM

## 2022-09-19 ENCOUNTER — OFFICE VISIT (OUTPATIENT)
Dept: CARDIOLOGY CLINIC | Facility: CLINIC | Age: 62
End: 2022-09-19
Payer: COMMERCIAL

## 2022-09-19 VITALS
DIASTOLIC BLOOD PRESSURE: 62 MMHG | WEIGHT: 132 LBS | SYSTOLIC BLOOD PRESSURE: 120 MMHG | HEIGHT: 62 IN | HEART RATE: 68 BPM | BODY MASS INDEX: 24.29 KG/M2

## 2022-09-19 DIAGNOSIS — I25.10 CORONARY ARTERY DISEASE INVOLVING NATIVE CORONARY ARTERY OF NATIVE HEART WITHOUT ANGINA PECTORIS: Primary | ICD-10-CM

## 2022-09-19 DIAGNOSIS — I10 ESSENTIAL HYPERTENSION, BENIGN: ICD-10-CM

## 2022-09-19 DIAGNOSIS — E78.5 DYSLIPIDEMIA: ICD-10-CM

## 2022-09-19 DIAGNOSIS — E11.9 TYPE 2 DIABETES MELLITUS WITHOUT COMPLICATION, WITHOUT LONG-TERM CURRENT USE OF INSULIN (HCC): ICD-10-CM

## 2022-09-19 DIAGNOSIS — I73.9 PVD (PERIPHERAL VASCULAR DISEASE) (HCC): ICD-10-CM

## 2022-09-19 PROCEDURE — 99214 OFFICE O/P EST MOD 30 MIN: CPT | Performed by: INTERNAL MEDICINE

## 2022-09-19 PROCEDURE — 93000 ELECTROCARDIOGRAM COMPLETE: CPT | Performed by: INTERNAL MEDICINE

## 2022-09-19 RX ORDER — PEN NEEDLE, DIABETIC 31 GX5/16"
NEEDLE, DISPOSABLE MISCELLANEOUS
COMMUNITY
Start: 2022-08-06

## 2022-09-19 RX ORDER — FERROUS SULFATE 325(65) MG
TABLET ORAL
COMMUNITY
Start: 2022-03-21

## 2022-09-19 RX ORDER — GEMFIBROZIL 600 MG/1
1 TABLET, FILM COATED ORAL 2 TIMES DAILY
COMMUNITY

## 2022-09-19 RX ORDER — INSULIN ASPART 100 [IU]/ML
INJECTION, SOLUTION INTRAVENOUS; SUBCUTANEOUS
COMMUNITY
Start: 2022-08-14

## 2022-09-19 NOTE — PROGRESS NOTES
Cardiology Office Note  MD Jurgen Hernandez MD Emmitt Jain, DO, [facility] MD Viki Soto DO, Stephon Reddy DO, [facility] [place]  ----------------------------------------------------------------  [address] [address]    Robin Pearl 64 y o  female MRN: [id number]  Unit/Bed#:  Encounter: [id number]      History of Present Illness: It was a pleasure to see Robin Pearl in the office today for follow up CV evaluation  She has a past medical history CAD with prior MI in [facility] and multiple stents in 2011, 2014 in 2017, dyslipidemia and type 2 diabetes mellitus  She established care with us in August 2021  She has prior myocardial infarction that occurred while she was in [facility]   At that time, she has had stents placed  Over the years, she has had several other stents placed  She states that she initially had symptoms of discomfort in her left chest wall radiating down her left arm  The symptoms were fairly significant and she was taken to the emergency department in [facility] where she was found to have myocardial infarction of the circumflex  Following the as initial stents that were placed, she has also had additional stents placed to the LAD with recurrent stents placed to the circumflex  She presented to our office to both establish care and also because she was having left shoulder pain as well as some exertional symptoms  She had admitted to some more fatigue and dyspnea on exertion  The symptoms have been similar to her anginal equivalent  She was not readily admitting to the shortness of breath and exertional symptoms at 1st, but has now been admitting to them to family  She has stress test that was found to be abnormal with small apical and anteroapical ischemia  Due to these findings, she was sent for cardiac catheterization    Cardiac catheterization demonstrated mild disease of the LAD and circumflex in October 2021  Since the catheterization, overall she has been feeling well  She did have a single chest pain rule out evaluation in July 2022 and did rule out  Since that time, she has been feeling well  She ambulates without any chest pain, pressure tightness or squeezing  Denies significant dyspnea on exertion  Denies lower extremity swelling, orthopnea or paroxysmal nocturnal dyspnea  Admits to fatigue which is chronic  Review of Systems:  Review of Systems   Constitutional: Positive for malaise/fatigue  Negative for decreased appetite, fever, weight gain and weight loss  HENT: Negative for congestion and sore throat  Eyes: Negative for visual disturbance  Cardiovascular: Negative for chest pain, dyspnea on exertion, leg swelling, near-syncope and palpitations  Respiratory: Negative for cough and shortness of breath  Hematologic/Lymphatic: Negative for bleeding problem  Skin: Negative for rash  Musculoskeletal: Negative for myalgias and neck pain  Gastrointestinal: Negative for abdominal pain and nausea  Neurological: Negative for light-headedness and weakness  Psychiatric/Behavioral: Negative for depression         Past Medical History:   Diagnosis Date    Coronary artery disease involving native coronary artery of native heart without angina pectoris     [facility]     Dyslipidemia     Essential hypertension     GERD (gastroesophageal reflux disease)     Hyperlipidemia     Hypertension     [facility] infarction) [facility]     Myocardial infarction [facility]     SOB (shortness of breath)     cardiac cath today 10/18/2021    Type 2 diabetes mellitus ([facility]     Wears glasses        Past Surgical History:   Procedure Laterality Date    CARDIAC CATHETERIZATION Left     Multiple catheterizations in 2011, 2014 in 2017 in [address] N/A 10/18/2021    Procedure: Cardiac catheterization;  Surgeon: Leah Tyler DO; [facility]; Service: Cardiology    CATARACT EXTRACTION         Social History     Socioeconomic History    Marital status: Single     Spouse name: None    Number of children: None    Years of education: None    Highest education level: None   Occupational History    None   Tobacco Use    Smoking status: Former Smoker     Types: Cigarettes     Quit date: 2001     Years since quittin 1    Smokeless tobacco: Never Used   Vaping Use    Vaping Use: Never used   Substance and Sexual Activity    Alcohol use: Never    Drug use: Never    Sexual activity: None   Other Topics Concern    None   Social History Narrative    None     Social Determinants of Health     Financial Resource Strain: Not on file   Food Insecurity: Not on file   Transportation Needs: Not on file   Physical Activity: Not on file   Stress: Not on file   Social Connections: Not on file   Intimate Partner Violence: Not on file   Housing Stability: Not on file       History reviewed  No pertinent family history      No Known Allergies      Current Outpatient Medications:     aspirin (ECOTRIN LOW STRENGTH) 81 mg EC tablet, Take 81 mg by mouth daily, Disp: , Rfl:     atorvastatin (LIPITOR) 80 mg tablet, take 1 tablet by mouth once daily, Disp: 90 tablet, Rfl: 1    B-D ULTRAFINE III SHORT PEN 31G X 8 MM MISC, use 1 PEN NEEDLE to inject MEDICATION subcutaneously three times a day, Disp: , Rfl:     clopidogrel (PLAVIX) 75 mg tablet, Take 75 mg by mouth daily, Disp: , Rfl:     fenofibrate (TRICOR) 145 mg tablet, take 1 tablet by mouth once daily, Disp: 90 tablet, Rfl: 1    ferrous sulfate 325 (65 Fe) mg tablet, Take 1 tablet every day by oral route in the evening , Disp: , Rfl:     gabapentin (NEURONTIN) 400 mg capsule, Take 400 mg by mouth Three times a day, Disp: , Rfl:     gemfibrozil (LOPID) 600 mg tablet, Take 1 tablet by mouth 2 (two) times a day, Disp: , Rfl:     insulin aspart (NovoLOG FlexPen) 100 UNIT/ML injection pen, inject 10 unit subcutaneously three times a day with food, Disp: , Rfl:     insulin aspart, w/niacinamide, (FIASP) 100 Units/mL injection pen, Inject 10 units 3 times a day by subcutaneous route for 90 days  , Disp: , Rfl:     insulin glargine (LANTUS SOLOSTAR) 100 units/mL injection pen, Inject 32 Units under the skin daily at bedtime , Disp: , Rfl:     insulin lispro (HumaLOG) 100 units/mL injection pen, Inject 10 Units under the skin 3 (three) times a day with meals , Disp: , Rfl:     isosorbide dinitrate (ISORDIL) 30 mg tablet, Take 30 mg by mouth Three times a day, Disp: , Rfl:     levothyroxine 25 mcg tablet, Take 25 mcg by mouth daily, Disp: , Rfl:     lisinopril (ZESTRIL) 5 mg tablet, Take 5 mg by mouth daily, Disp: , Rfl:     metFORMIN (GLUCOPHAGE) 500 mg tablet, Take 1 tablet by mouth 2 (two) times a day, Disp: , Rfl:     methocarbamol (ROBAXIN) 500 mg tablet, Take 500 mg by mouth 4 (four) times a day as needed, Disp: , Rfl:     metoprolol succinate (TOPROL-XL) 25 mg 24 hr tablet, Take 25 mg by mouth daily, Disp: , Rfl:     omeprazole (PriLOSEC) 20 mg delayed release capsule, Take 20 mg by mouth daily  , Disp: , Rfl:     Vitals:    09/19/22 0758   BP: 120/62   BP Location: Right arm   Patient Position: Sitting   Cuff Size: Adult   Pulse: 68   Weight: 59 9 kg (132 lb)   Height: 5' 2" (1 575 m)       PHYSICAL EXAMINATION:  Gen: Awake, Alert, NAD   Head/eyes: AT/NC, pupils equal and round, Anicteric  ENT: mmm  Neck: Supple, No elevated JVP, trachea midline  Resp: CTA bilaterally no w/r/r  CV: RRR +S1, S2, No m/r/g  Abd: Soft, NT/ND + BS  Ext: no LE edema bilaterally  Neuro:  Follows commands, moves all extermities  Psych: Appropriate affect, pleasant mood, pleasant attitude, non-combative  Skin: warm; no rash, erythema or venous stasis changes on exposed skin    --------------------------------------------------------------------------------  TREADMILL STRESS  No results found for this or any previous visit      --------------------------------------------------------------------------------  NUCLEAR STRESS TEST: No results found for this or any previous visit  No results found for this or any previous visit       --------------------------------------------------------------------------------  CATH:  No results found for this or any previous visit     --------------------------------------------------------------------------------  ECHO:   No results found for this or any previous visit  No results found for this or any previous visit     --------------------------------------------------------------------------------  HOLTER  No results found for this or any previous visit  No results found for this or any previous visit     --------------------------------------------------------------------------------  CAROTIDS  No results found for this or any previous visit      --------------------------------------------------------------------------------  ECGs:  No results found for this visit on 09/19/22  Lab Results   Component Value Date    WBC 4 23 (L) 10/02/2021    HGB 11 1 (L) 10/02/2021    HCT 35 0 10/02/2021    MCV 92 10/02/2021     10/02/2021      Lab Results   Component Value Date    SODIUM 143 10/02/2021    K 4 7 10/02/2021     10/02/2021    CO2 30 10/02/2021    BUN 18 10/02/2021    CREATININE 1 18 10/02/2021    CALCIUM 9 5 10/02/2021      No results found for: HGBA1C   No results found for: CHOL  Lab Results   Component Value Date    HDL 38 (L) 12/04/2021    HDL 55 09/11/2021     Lab Results   Component Value Date    LDLCALC 116 (H) 12/04/2021    LDLCALC 144 (H) 09/11/2021     Lab Results   Component Value Date    TRIG 170 (H) 12/04/2021    TRIG 100 09/11/2021     No results found for: [place], [place]   Lab Results   Component Value Date    INR 1 23 (H) 10/02/2021    PROTIME 15 1 (H) 10/02/2021        1   Coronary artery disease involving native coronary artery of native heart without angina pectoris  -     POCT ECG    2  Essential hypertension, benign  -     POCT ECG    3  Dyslipidemia    4  Type 2 diabetes mellitus without complication, without long-term current use of insulin ([facility] )    5  PVD (peripheral vascular disease) ([facility] )        IMPRESSION:  · CAD/MI [address]  ? ZAC-mLCx, dLCx and mLAD, June 2011  ? ZAC-dLCx, February 2014  ? ZAC-pLAD, October 2017  ? ZAC-LCx, November 2017  ? Abnormal pharmacologic nuclear stress test with small apical/anteroapical ischemia, gated EF 65%, September 2021  ? pLAD 35%, mLAD 25%, 20% LCx, patent stents of mLAD and mLCx, October 2021  · Peripheral vascular disease s/p mild diffuse disease bilaterally, December 2021  · Hypertension   · LVEF 55%, mild MAC, trace MR/TR, September 2021  · Dyslipidemia w/  mg/dL, December 2021  · Type 2 diabetes mellitus    PLAN:  It was a pleasure to see Alexander Atkins in the office today for follow up CV evaluation  She is here today for routine CV follow-up  Since our last encounter, she reports to doing well  She currently has no symptoms of angina and no signs or symptoms of heart failure  She examines to be euvolemic in the office today  Blood pressure and heart rate are currently stable  She is tolerating her current medications without any reported adverse effects  She can perform greater than 4 Mets on a daily basis without significant exertional symptoms, but does not like to climb stairs regularly  ECG is nonischemic  Based on her clinical presentation, the following recommendations:    1  Recommend aggressive risk factor and lifestyle modifications  2  Would encourage 30 minutes a day, 5 days a week of moderate intensity activity to build cardiovascular endurance  3  Recommend heart healthy diet low in sodium and carbohydrate  We have previously discussed Mediterranean diet  Patient's son has been also providing portion control to help  4  Continue atorvastatin and fenofibrate    Goal LDL is less than 70 mg/dL  Should her LDL not be to goal on repeat, would initiate Zetia 10 mg daily  5  Continue lifelong aspirin and continue Plavix  6  Continue lisinopril, metoprolol succinate and isosorbide for antihypertensive control  7  Should she have any recurrence of her chest discomfort worsening in frequency or severity or change in quality, recommend seeking immediate medical attention/dial 911  8  Follow-up with primary care for blood glucose management  9  We will follow up with her in 6 months to reassess her progress  As always, please do not hesitate to call with any questions  Portions of the record may have been created with voice recognition software  Occasional wrong word or "sound a like" substitutions may have occurred due to the inherent limitations of voice recognition software  Read the chart carefully and recognize, using context, where substitutions have occurred        Signed: Jose F Powell DO, [facility] [place], [place], [place]

## 2022-10-31 ENCOUNTER — TELEPHONE (OUTPATIENT)
Dept: NEPHROLOGY | Facility: CLINIC | Age: 62
End: 2022-10-31

## 2022-10-31 NOTE — TELEPHONE ENCOUNTER
Appointment Confirmation   Person confirmed appointment with  If not patient, name of the person Patient    Date and time of appointment 11/01   10:00    Patient acknowledged and will be at appointment? yes    Did you advise the patient that they will need a urine sample if they are a new patient?  Yes    Did you advise the patient to bring their current medications for verification? (including any OTC) Yes    Additional Information

## 2022-11-01 ENCOUNTER — TELEPHONE (OUTPATIENT)
Dept: NEPHROLOGY | Facility: CLINIC | Age: 62
End: 2022-11-01

## 2022-11-01 NOTE — TELEPHONE ENCOUNTER
Reschedule Appointment   Person speaking to  Child   Date of original appointment 11/1/22    New appointment date 2/27/23   Patient on dialysis no   Location [place]    Provider Dr Jesika Daniel

## 2022-11-01 NOTE — TELEPHONE ENCOUNTER
I called Penelope Braun regarding the appointment that she missed today   She stated that she has an appointment with [facility]

## 2022-11-08 ENCOUNTER — TELEPHONE (OUTPATIENT)
Dept: NEPHROLOGY | Facility: CLINIC | Age: 62
End: 2022-11-08

## 2022-11-16 ENCOUNTER — ANESTHESIA (OUTPATIENT)
Dept: GASTROENTEROLOGY | Facility: HOSPITAL | Age: 62
End: 2022-11-16

## 2022-11-16 ENCOUNTER — ANESTHESIA EVENT (OUTPATIENT)
Dept: GASTROENTEROLOGY | Facility: HOSPITAL | Age: 62
End: 2022-11-16

## 2022-11-16 ENCOUNTER — HOSPITAL ENCOUNTER (OUTPATIENT)
Dept: GASTROENTEROLOGY | Facility: HOSPITAL | Age: 62
Setting detail: OUTPATIENT SURGERY
Discharge: HOME | End: 2022-11-16
Attending: INTERNAL MEDICINE

## 2022-11-16 VITALS
RESPIRATION RATE: 18 BRPM | TEMPERATURE: 97.1 F | SYSTOLIC BLOOD PRESSURE: 114 MMHG | HEART RATE: 80 BPM | DIASTOLIC BLOOD PRESSURE: 60 MMHG | OXYGEN SATURATION: 95 %

## 2022-11-16 PROBLEM — I21.9 MI (MYOCARDIAL INFARCTION) (HCC): Status: ACTIVE | Noted: 2022-11-16

## 2022-11-16 PROBLEM — E11.9 DIABETES MELLITUS, TYPE 2 (HCC): Status: ACTIVE | Noted: 2022-11-16

## 2022-11-16 PROBLEM — I25.10 CAD (CORONARY ARTERY DISEASE): Status: ACTIVE | Noted: 2022-11-16

## 2022-11-16 RX ORDER — LIDOCAINE HYDROCHLORIDE 10 MG/ML
INJECTION, SOLUTION EPIDURAL; INFILTRATION; INTRACAUDAL; PERINEURAL AS NEEDED
Status: DISCONTINUED | OUTPATIENT
Start: 2022-11-16 | End: 2022-11-16

## 2022-11-16 RX ORDER — PROPOFOL 10 MG/ML
INJECTION, EMULSION INTRAVENOUS CONTINUOUS PRN
Status: DISCONTINUED | OUTPATIENT
Start: 2022-11-16 | End: 2022-11-16

## 2022-11-16 RX ORDER — SODIUM CHLORIDE 9 MG/ML
INJECTION, SOLUTION INTRAVENOUS CONTINUOUS PRN
Status: DISCONTINUED | OUTPATIENT
Start: 2022-11-16 | End: 2022-11-16

## 2022-11-16 RX ORDER — PROPOFOL 10 MG/ML
INJECTION, EMULSION INTRAVENOUS AS NEEDED
Status: DISCONTINUED | OUTPATIENT
Start: 2022-11-16 | End: 2022-11-16

## 2022-11-16 RX ADMIN — SODIUM CHLORIDE: 9 INJECTION, SOLUTION INTRAVENOUS at 13:58

## 2022-11-16 RX ADMIN — LIDOCAINE HYDROCHLORIDE 5 ML: 10 INJECTION, SOLUTION EPIDURAL; INFILTRATION; INTRACAUDAL at 14:03

## 2022-11-16 RX ADMIN — PROPOFOL 120 MG: 10 INJECTION, EMULSION INTRAVENOUS at 14:01

## 2022-11-16 RX ADMIN — LIDOCAINE HYDROCHLORIDE 5 ML: 10 INJECTION, SOLUTION EPIDURAL; INFILTRATION; INTRACAUDAL at 14:01

## 2022-11-16 RX ADMIN — PROPOFOL 120 MCG/KG/MIN: 10 INJECTION, EMULSION INTRAVENOUS at 14:00

## 2022-11-16 NOTE — ANESTHESIA POSTPROCEDURE EVALUATION
Post-Op Assessment Note    CV Status:  Stable  Pain Score: 0    Pain management: adequate     Mental Status:  Sleepy   Hydration Status:  Euvolemic   PONV Controlled:  Controlled   Airway Patency:  Patent      Post Op Vitals Reviewed: Yes      Staff: Anesthesiologist, CRNA         No notable events documented      BP 93/57 (11/16/22 1412)    Temp (!) 97 1 °F (36 2 °C) (11/16/22 1412)    Pulse 82 (11/16/22 1412)   Resp 18 (11/16/22 1412)    SpO2 98 % (11/16/22 1412) face mask

## 2022-11-16 NOTE — H&P
History and Physical - [facility]  Sandra Hanks 58 y o  female MRN: [id number]                  HPI: Sandra Hanks is a 58y o  year old female who presents for an EGD  Indications for the procedure:  Epigastric pain, gastroesophageal reflux, nausea  REVIEW OF SYSTEMS: Per the HPI, and otherwise unremarkable  Historical Information   Past Medical History:   Diagnosis Date   • Coronary artery disease involving native coronary artery of native heart without angina pectoris    • Diabetes mellitus ([place] [address] )    • Dyslipidemia    • Essential hypertension    • GERD (gastroesophageal reflux disease)    • Hyperlipidemia    • Hypertension    • MI ([facility]ocardial infarction) ([facility] )    • Myocardial infarction ([place] [address] )    • SOB (shortness of breath)     cardiac cath today 10/18/2021   • Type 2 diabetes mellitus ([place] [address] )    • Wears glasses      Past Surgical History:   Procedure Laterality Date   • CARDIAC CATHETERIZATION Left     Multiple catheterizations in , 2014 in 2017 in [address] [facility] N/A 10/18/2021    Procedure: Cardiac catheterization;  Surgeon: Shruti Jackson DO;  Location: [facility]; Service: Cardiology   • CATARACT EXTRACTION       Social History   Social History     Substance and Sexual Activity   Alcohol Use Never     Social History     Substance and Sexual Activity   Drug Use Never     Social History     Tobacco Use   Smoking Status Former   • Types: Cigarettes   • Quit date: 2001   • Years since quittin 3   Smokeless Tobacco Never     History reviewed  No pertinent family history      Meds/Allergies       Current Outpatient Medications:   •  aspirin (ECOTRIN LOW STRENGTH) 81 mg EC tablet  •  atorvastatin (LIPITOR) 80 mg tablet  •  clopidogrel (PLAVIX) 75 mg tablet  •  fenofibrate (TRICOR) 145 mg tablet  •  gabapentin (NEURONTIN) 400 mg capsule  •  gemfibrozil (LOPID) 600 mg tablet  •  insulin aspart, w/niacinamide, (FIASP) 100 Units/mL injection pen  •  insulin glargine (LANTUS SOLOSTAR) 100 units/mL injection pen  •  isosorbide dinitrate (ISORDIL) 30 mg tablet  •  levothyroxine 25 mcg tablet  •  lisinopril (ZESTRIL) 5 mg tablet  •  metFORMIN (GLUCOPHAGE) 500 mg tablet  •  methocarbamol (ROBAXIN) 500 mg tablet  •  metoprolol succinate (TOPROL-XL) 25 mg 24 hr tablet  •  omeprazole (PriLOSEC) 20 mg delayed release capsule  •  B-D ULTRAFINE III SHORT PEN 31G X 8 MM MISC  •  ferrous sulfate 325 (65 Fe) mg tablet  •  insulin aspart (NovoLOG FlexPen) 100 UNIT/ML injection pen    No Known Allergies    Objective     /69   Pulse 68   Temp (!) 97 °F (36 1 °C) (Tympanic)   Resp 18   SpO2 96%       PHYSICAL EXAM    Gen: NAD  Head: NCAT  CV: RRR  CHEST: Clear  ABD: soft, NT/ND  EXT: no edema      ASSESSMENT/PLAN:  This is a 58y o  year old female here for EGD and she is stable and optimized for her procedure

## 2022-11-16 NOTE — ANESTHESIA PREPROCEDURE EVALUATION
Procedure:  EGD    Relevant Problems   ANESTHESIA   (-) PONV (postoperative nausea and vomiting)      CARDIO   (+) CAD (coronary artery disease)   (+) MI (myocardial infarction) (HCC)      ENDO   (+) Diabetes mellitus, type 2 (HCC)      PULMONARY   (-) Oxygen dependent   (-) Shortness of breath   (-) URI (upper respiratory infection)        Physical Exam    Airway    Mallampati score: II  TM Distance: >3 FB  Neck ROM: full     Dental       Cardiovascular  Cardiovascular exam normal    Pulmonary  Pulmonary exam normal     Other Findings        Anesthesia Plan  ASA Score- 3     Anesthesia Type- IV sedation with anesthesia with ASA Monitors  Additional Monitors:   Airway Plan:           Plan Factors-Exercise tolerance (METS): >4 METS  Chart reviewed  Existing labs reviewed  Patient summary reviewed  Patient is not a current smoker  Induction- intravenous  Postoperative Plan-     Informed Consent- Anesthetic plan and risks discussed with patient  I personally reviewed this patient with the CRNA  Discussed and agreed on the Anesthesia Plan with the CRNA  Libby Mera is a 58 y o  female presenting today for EGD  Pertinent medical history includes: MI, CAD,  History of anesthesia: no issues  NPO since last night  Denies N/V, F, chills, CP, SOB  AQA  Plan for MAC

## 2022-11-17 LAB — GLUCOSE SERPL-MCNC: 154 MG/DL (ref 65–140)

## 2023-03-13 ENCOUNTER — DOCUMENTATION (OUTPATIENT)
Dept: NEPHROLOGY | Facility: CLINIC | Age: 63
End: 2023-03-13

## 2023-03-13 NOTE — LETTER
March 13, 2023     [place] [address] [place] [address]      Dear Ms Tay: We understand that many situations arise that occasionally prevents patients from keeping scheduled appointments  It is the policy of [facility] that patients notify us 24 hours in advance if unable to keep a scheduled appointment  Missed appointments jeopardize strong patient-physician relationships and may compromise our ability to provide you with appropriate quality medical care  The appointment you missed could have easily been made available to another patient if you had contacted us to cancel  We like to accommodate all of our patients, but when patients miss an appointment it prevents us from being able to help everyone  In the future, we request at least 24 hours notice of cancellation so we can make your appointment available to someone else in need  We show that you missed your appointment on 2/27 and it has been rescheduled for 4/17   Please call us at [phone] if you’re unable to keep this appointment  At this time, you have missed two or more appointments  Should you miss another appointment or cancel with failure to give 24 hours notice, we may no longer be able to maintain a patient-physician relationship and may not be able to schedule any further appointments      Sincerely,        [facility]

## 2023-05-11 ENCOUNTER — TELEPHONE (OUTPATIENT)
Dept: CARDIOLOGY CLINIC | Facility: CLINIC | Age: 63
End: 2023-05-11

## 2023-05-11 NOTE — TELEPHONE ENCOUNTER
PC from son stating mother did not get Zio in mail as ordered  I checked with Howie Mendenhall and it was delivered to their home on 4/29/23  I tried to call son back but his phone number is for [place] and I cannot get through

## 2023-07-31 ENCOUNTER — OFFICE VISIT (OUTPATIENT)
Dept: CARDIOLOGY CLINIC | Facility: CLINIC | Age: 63
End: 2023-07-31
Payer: COMMERCIAL

## 2023-07-31 VITALS
SYSTOLIC BLOOD PRESSURE: 100 MMHG | DIASTOLIC BLOOD PRESSURE: 52 MMHG | HEIGHT: 62 IN | HEART RATE: 74 BPM | OXYGEN SATURATION: 96 % | BODY MASS INDEX: 24.11 KG/M2 | WEIGHT: 131 LBS

## 2023-07-31 DIAGNOSIS — E11.9 TYPE 2 DIABETES MELLITUS WITHOUT COMPLICATION, WITHOUT LONG-TERM CURRENT USE OF INSULIN (HCC): ICD-10-CM

## 2023-07-31 DIAGNOSIS — R00.2 PALPITATIONS: ICD-10-CM

## 2023-07-31 DIAGNOSIS — I25.10 CORONARY ARTERY DISEASE INVOLVING NATIVE CORONARY ARTERY OF NATIVE HEART WITHOUT ANGINA PECTORIS: Primary | ICD-10-CM

## 2023-07-31 DIAGNOSIS — I73.9 PVD (PERIPHERAL VASCULAR DISEASE) (HCC): ICD-10-CM

## 2023-07-31 DIAGNOSIS — E78.5 DYSLIPIDEMIA: ICD-10-CM

## 2023-07-31 DIAGNOSIS — I10 ESSENTIAL HYPERTENSION, BENIGN: ICD-10-CM

## 2023-07-31 PROCEDURE — 99214 OFFICE O/P EST MOD 30 MIN: CPT | Performed by: INTERNAL MEDICINE

## 2023-07-31 RX ORDER — OMEPRAZOLE 40 MG/1
CAPSULE, DELAYED RELEASE ORAL
COMMUNITY
Start: 2023-07-06

## 2023-07-31 RX ORDER — ISOSORBIDE MONONITRATE 30 MG/1
30 TABLET, EXTENDED RELEASE ORAL DAILY
COMMUNITY
Start: 2023-07-09

## 2023-07-31 RX ORDER — GEMFIBROZIL 600 MG/1
600 TABLET, FILM COATED ORAL 2 TIMES DAILY
COMMUNITY
Start: 2023-07-24

## 2023-07-31 NOTE — PROGRESS NOTES
Cardiology Office Note  MD Nadira Díaz MD Chales Rain, DO, [address] MD Portillo Canseco DO, Romell Osler, DO, [facility]  ----------------------------------------------------------------  [facility]  [address] [place] [address]    Sho Mathews 58 y.o. female MRN: [id number]  Unit/Bed#:  Encounter: [id number]      History of Present Illness: It was a pleasure to see Sho Mathews in the office today for follow up CV evaluation. She has a past medical history CAD with prior MI in [place] and multiple stents in 2011, 2014 in 2017, dyslipidemia and type 2 diabetes mellitus. She established care with us in August 2021. She has prior myocardial infarction that occurred while she was in [place].  At that time, she has had stents placed. Over the years, she has had several other stents placed. She states that she initially had symptoms of discomfort in her left chest wall radiating down her left arm. The symptoms were fairly significant and she was taken to the emergency department in [place] where she was found to have myocardial infarction of the circumflex. Following the as initial stents that were placed, she has also had additional stents placed to the LAD with recurrent stents placed to the circumflex. She presented to our office to both establish care and also because she was having left shoulder pain as well as some exertional symptoms. She had admitted to some more fatigue and dyspnea on exertion. The symptoms have been similar to her anginal equivalent. She was not readily admitting to the shortness of breath and exertional symptoms at 1st, but has now been admitting to them to family. She has stress test that was found to be abnormal with small apical and anteroapical ischemia. Due to these findings, she was sent for cardiac catheterization.   Cardiac catheterization demonstrated mild disease of the LAD and circumflex in October 2021. Since the catheterization, overall she has been feeling well. She did have a single chest pain rule out evaluation in July 2022 and did rule out. Following the catheterization, she had not been having any symptoms. In March 2023, the patient admitted to some palpitations that lasted for several minutes. The episodes felt as though a drained her from energy, but she had no loss of consciousness. She was recommended for event recorder, but the event recorder was never obtained. Following the office encounter, her palpitations resolved. She did admit to some ankle discomfort that has been bothering her when she stands up. She denies any chest pain, pressure, tightness or squeezing. Denies lower extremity swelling orthopnea or paroxysmal nocturnal dyspnea. Review of Systems:  Review of Systems   Constitutional: Negative for decreased appetite, fever, malaise/fatigue, weight gain and weight loss. HENT: Negative for congestion and sore throat. Eyes: Negative for visual disturbance. Cardiovascular: Negative for chest pain, dyspnea on exertion, leg swelling, near-syncope and palpitations. Respiratory: Negative for cough and shortness of breath. Hematologic/Lymphatic: Negative for bleeding problem. Skin: Negative for rash. Musculoskeletal: Negative for myalgias and neck pain. Gastrointestinal: Negative for abdominal pain and nausea. Neurological: Negative for light-headedness and weakness. Psychiatric/Behavioral: Negative for depression.        Past Medical History:   Diagnosis Date   • Coronary artery disease involving native coronary artery of native heart without angina pectoris    • Diabetes mellitus ([address])    • Dyslipidemia    • Essential hypertension    • GERD (gastroesophageal reflux disease)    • Hyperlipidemia    • Hypertension    • MI (myocardial infarction) ([address])    • Myocardial infarction ([address])    • SOB (shortness of breath)     cardiac cath today 10/18/2021   • Type 2 diabetes mellitus ([facility])    • Wears glasses        Past Surgical History:   Procedure Laterality Date   • CARDIAC CATHETERIZATION Left     Multiple catheterizations in , 2014 in 2017 in [address] [place] [address] N/A 10/18/2021    Procedure: Cardiac catheterization;  Surgeon: Delores Lemus DO;  Location: [facility]; Service: Cardiology   • CATARACT EXTRACTION         Social History     Socioeconomic History   • Marital status: Single     Spouse name: None   • Number of children: None   • Years of education: None   • Highest education level: None   Occupational History   • None   Tobacco Use   • Smoking status: Former     Types: Cigarettes     Quit date: 2001     Years since quittin.0   • Smokeless tobacco: Never   Vaping Use   • Vaping Use: Never used   Substance and Sexual Activity   • Alcohol use: Never   • Drug use: Never   • Sexual activity: None   Other Topics Concern   • None   Social History Narrative   • None     Social Determinants of Health     Financial Resource Strain: Not on file   Food Insecurity: Not on file   Transportation Needs: Not on file   Physical Activity: Not on file   Stress: Not on file   Social Connections: Not on file   Intimate Partner Violence: Not on file   Housing Stability: Not on file       History reviewed. No pertinent family history.     No Known Allergies      Current Outpatient Medications:   •  aspirin (ECOTRIN LOW STRENGTH) 81 mg EC tablet, Take 81 mg by mouth daily, Disp: , Rfl:   •  atorvastatin (LIPITOR) 80 mg tablet, take 1 tablet by mouth once daily, Disp: 90 tablet, Rfl: 1  •  B-D ULTRAFINE III SHORT PEN 31G X 8 MM MISC, use 1 PEN NEEDLE to inject MEDICATION subcutaneously three times a day, Disp: , Rfl:   •  clopidogrel (PLAVIX) 75 mg tablet, Take 75 mg by mouth daily, Disp: , Rfl:   •  fenofibrate (TRICOR) 145 mg tablet, take 1 tablet by mouth once daily, Disp: 90 tablet, Rfl: 1  •  gabapentin (NEURONTIN) 400 mg capsule, Take 400 mg by mouth Three times a day, Disp: , Rfl:   •  gemfibrozil (LOPID) 600 mg tablet, Take 600 mg by mouth 2 (two) times a day, Disp: , Rfl:   •  insulin aspart, w/niacinamide, (FIASP) 100 Units/mL injection pen, Inject 10 units 3 times a day by subcutaneous route for 90 days. , Disp: , Rfl:   •  insulin glargine (LANTUS SOLOSTAR) 100 units/mL injection pen, Inject 32 Units under the skin daily at bedtime , Disp: , Rfl:   •  isosorbide mononitrate (IMDUR) 30 mg 24 hr tablet, Take 30 mg by mouth daily, Disp: , Rfl:   •  levothyroxine 25 mcg tablet, Take 25 mcg by mouth daily, Disp: , Rfl:   •  lisinopril (ZESTRIL) 5 mg tablet, Take 5 mg by mouth daily, Disp: , Rfl:   •  metFORMIN (GLUCOPHAGE) 500 mg tablet, Take 1 tablet by mouth 2 (two) times a day, Disp: , Rfl:   •  metoprolol succinate (TOPROL-XL) 25 mg 24 hr tablet, Take 25 mg by mouth daily, Disp: , Rfl:   •  omeprazole (PriLOSEC) 20 mg delayed release capsule, Take 20 mg by mouth daily  , Disp: , Rfl:   •  ezetimibe (ZETIA) 10 mg tablet, Take 1 tablet (10 mg total) by mouth daily, Disp: 90 tablet, Rfl: 3  •  ferrous sulfate 325 (65 Fe) mg tablet, Take 1 tablet every day by oral route in the evening.  (Patient not taking: Reported on 7/31/2023), Disp: , Rfl:   •  insulin aspart (NovoLOG FlexPen) 100 UNIT/ML injection pen, inject 10 unit subcutaneously three times a day with food (Patient not taking: Reported on 7/31/2023), Disp: , Rfl:   •  isosorbide dinitrate (ISORDIL) 30 mg tablet, Take 30 mg by mouth Three times a day (Patient not taking: Reported on 7/31/2023), Disp: , Rfl:   •  methocarbamol (ROBAXIN) 500 mg tablet, Take 500 mg by mouth 4 (four) times a day as needed (Patient not taking: Reported on 7/31/2023), Disp: , Rfl:   •  omeprazole (PriLOSEC) 40 MG capsule, TAKE 1 CAPSULE BY MOUTH EVERY DAY 1/2 HOUR BEFORE BREAKFAST (Patient not taking: Reported on 7/31/2023), Disp: , Rfl:     Vitals:    07/31/23 1605   BP: 100/52   Pulse: 74 SpO2: 96%   Weight: 59.4 kg (131 lb)   Height: 5' 2" (1.575 m)       PHYSICAL EXAMINATION:  Gen: Awake, Alert, NAD   Head/eyes: AT/NC, pupils equal and round, Anicteric  ENT: mmm  Neck: Supple, No elevated JVP, trachea midline  Resp: CTA bilaterally no w/r/r  CV: RRR +S1, S2, No m/r/g  Abd: Soft, NT/ND + BS  Ext: no LE edema bilaterally  Neuro: Follows commands, moves all extermities  Psych: Appropriate affect, happy mood, cooperative attitude, non-combative  Skin: warm; no rash, erythema or venous stasis changes on exposed skin    --------------------------------------------------------------------------------  TREADMILL STRESS  No results found for this or any previous visit.     --------------------------------------------------------------------------------  NUCLEAR STRESS TEST: No results found for this or any previous visit. No results found for this or any previous visit.      --------------------------------------------------------------------------------  CATH:  No results found for this or any previous visit.    --------------------------------------------------------------------------------  ECHO:   No results found for this or any previous visit. No results found for this or any previous visit.    --------------------------------------------------------------------------------  HOLTER  No results found for this or any previous visit. No results found for this or any previous visit.    --------------------------------------------------------------------------------  CAROTIDS  No results found for this or any previous visit.     --------------------------------------------------------------------------------  ECGs:  No results found for this visit on 07/31/23.      Lab Results   Component Value Date    WBC 4.23 (L) 10/02/2021    HGB 11.1 (L) 10/02/2021    HCT 35.0 10/02/2021    MCV 92 10/02/2021     10/02/2021      Lab Results   Component Value Date    SODIUM 143 10/02/2021    K 4.7 10/02/2021     10/02/2021    CO2 30 10/02/2021    BUN 18 10/02/2021    CREATININE 1.18 10/02/2021    CALCIUM 9.5 10/02/2021      Lab Results   Component Value Date    HGBA1C 8.2 (H) 04/03/2023      No results found for: "CHOL"  Lab Results   Component Value Date    HDL 38 (L) 12/04/2021    HDL 55 09/11/2021     Lab Results   Component Value Date    LDLCALC 116 (H) 12/04/2021    LDLCALC 144 (H) 09/11/2021     Lab Results   Component Value Date    TRIG 170 (H) 12/04/2021    TRIG 100 09/11/2021     No results found for: "CHOLHDL"   Lab Results   Component Value Date    INR 1.23 (H) 10/02/2021    PROTIME 15.1 (H) 10/02/2021        1. Coronary artery disease involving native coronary artery of native heart without angina pectoris    2. Palpitations    3. PVD (peripheral vascular disease) ([address])    4. Type 2 diabetes mellitus without complication, without long-term current use of insulin ([address])    5. Essential hypertension, benign    6. Dyslipidemia        IMPRESSION:  · CAD/MI in [place]  ? ZAC-mLCx, dLCx and mLAD, June 2011  ? ZAC-dLCx, February 2014  ? ZAC-pLAD, October 2017  ? ZAC-LCx, November 2017  ? Abnormal pharmacologic nuclear stress test with small apical/anteroapical ischemia, gated EF 65%, September 2021  ? pLAD 35%, mLAD 25%, 20% LCx, patent stents of mLAD and mLCx, October 2021  · Palpitations  · Peripheral vascular disease s/p mild diffuse disease bilaterally, December 2021  · Hypertension   · LVEF 55%, mild MAC, trace MR/TR, September 2021  · Dyslipidemia w/  mg/dL, December 2021  · Type 2 diabetes mellitus    PLAN:  It was a pleasure to see Ruth Ann Merritt in the office today for follow up CV evaluation. She is here today for routine CV follow-up. Since her last encounter, her palpitations have gotten much better. She feels fairly well overall. She has had no chest pain concerning for angina and she has had no signs or symptoms of heart failure.   She examines to be euvolemic in the office today. Blood pressure and heart rate are currently stable. The patient is tolerating her current medications without any reported adverse effects. She does have some ankle discomfort that occurs when she stands. There is no significant swelling that I can appreciate. The patient can perform greater than 4 METS on a daily basis without significant exertional symptoms. Based on her clinical presentation, I have the following recommendations:    1. As her palpitations have markedly improved and she is feeling much back to her baseline, we will hold off on obtaining the event recorder for now. She feels much improved and we have discussed risk and benefits of evaluating currently. She will call the office if she has any further palpitations to obtain the monitor. 2.  Recommend heart healthy diet low in sodium and carbohydrate  3. Would encourage 30 minutes a day, 5 days a week of moderate intensity activity to build cardiovascular endurance. 4.  Continue high intensity statin, Zetia and fenofibrate. Repeat lipid panel in 6 months prior to her follow-up encounter. 5.  Continue current antihypertensive regimen including lisinopril, metoprolol and isosorbide. 6.  Lifelong aspirin. Continue Plavix. 7.  Should her symptoms recur, especially worsening in frequency or severity or change in quality, recommend calling the office or seek immediate medical attention. 8.  Follow-up with primary care for blood glucose management  9. We will follow-up with her in 6 months to review the results of her lipid panel and for routine follow-up. As always, please not hesitate to call with any questions. Portions of the record may have been created with voice recognition software. Occasional wrong word or "sound a like" substitutions may have occurred due to the inherent limitations of voice recognition software. Read the chart carefully and recognize, using context, where substitutions have occurred.       Signed: Hedy Ryan, , [id number] [facility]

## 2023-11-02 DIAGNOSIS — I25.10 CORONARY ARTERY DISEASE INVOLVING NATIVE CORONARY ARTERY OF NATIVE HEART WITHOUT ANGINA PECTORIS: Primary | ICD-10-CM

## 2023-11-02 RX ORDER — CLOPIDOGREL BISULFATE 75 MG/1
75 TABLET ORAL DAILY
Qty: 90 TABLET | Refills: 1 | Status: SHIPPED | OUTPATIENT
Start: 2023-11-02

## 2023-12-01 ENCOUNTER — HOSPITAL ENCOUNTER (EMERGENCY)
Facility: HOSPITAL | Age: 63
Discharge: HOME | End: 2023-12-01
Attending: EMERGENCY MEDICINE
Payer: COMMERCIAL

## 2023-12-01 VITALS
RESPIRATION RATE: 18 BRPM | DIASTOLIC BLOOD PRESSURE: 96 MMHG | HEART RATE: 82 BPM | TEMPERATURE: 98.1 F | OXYGEN SATURATION: 98 % | SYSTOLIC BLOOD PRESSURE: 144 MMHG

## 2023-12-01 DIAGNOSIS — L25.9 CONTACT DERMATITIS: Primary | ICD-10-CM

## 2023-12-01 PROCEDURE — 99284 EMERGENCY DEPT VISIT MOD MDM: CPT | Performed by: EMERGENCY MEDICINE

## 2023-12-01 PROCEDURE — 99282 EMERGENCY DEPT VISIT SF MDM: CPT

## 2023-12-01 RX ADMIN — DEXAMETHASONE 6 MG: 2 TABLET ORAL at 21:54

## 2023-12-02 NOTE — ED ATTENDING ATTESTATION
12/1/2023  IFatuma MD, saw and evaluated the patient. I have discussed the patient with the resident/non-physician practitioner and agree with the resident's/non-physician practitioner's findings, Plan of Care, and MDM as documented in the resident's/non-physician practitioner's note, except where noted. All available labs and Radiology studies were reviewed. I was present for key portions of any procedure(s) performed by the resident/non-physician practitioner and I was immediately available to provide assistance. At this point I agree with the current assessment done in the Emergency Department. I have conducted an independent evaluation of this patient a history and physical is as follows:    ED Course         Critical Care Time  Procedures    60 yo female got bleach on her hand few days ago and now having increased hand pain and swelling with flaky skin. No fever, no systemic symptoms. Pain in both hands. Vss, afebrile, lungs cta, rrr, bilateral hands with dry skin, peeling. Swelling, redness. Steroids, topical emolients. Contact dermatitis.

## 2023-12-02 NOTE — ED PROVIDER NOTES
History  Chief Complaint   Patient presents with    Hand Pain     BL, started 4d. Painful and palpitating. Denies other SS. +CMS     Patient is a 60-year-old female presenting with hand pain, swelling, and erythema after getting bleach on her hand a few days ago. Patient is not having any systemic symptoms, fever, fatigue, chest pain, shortness of breath. Patient otherwise well, without any other complaints. Patient is neurovascularly intact, no numbness, full range of motion preserved. Prior to Admission Medications   Prescriptions Last Dose Informant Patient Reported? Taking? B-D ULTRAFINE III SHORT PEN 31G X 8 MM MISC  Self, Family Member Yes No   Sig: use 1 PEN NEEDLE to inject MEDICATION subcutaneously three times a day   aspirin (ECOTRIN LOW STRENGTH) 81 mg EC tablet  Self, Family Member Yes No   Sig: Take 81 mg by mouth daily   atorvastatin (LIPITOR) 80 mg tablet  Self, Family Member No No   Sig: take 1 tablet by mouth once daily   clopidogrel (PLAVIX) 75 mg tablet   No No   Sig: Take 1 tablet (75 mg total) by mouth daily   ezetimibe (ZETIA) 10 mg tablet   No No   Sig: Take 1 tablet (10 mg total) by mouth daily   fenofibrate (TRICOR) 145 mg tablet  Self, Family Member No No   Sig: take 1 tablet by mouth once daily   ferrous sulfate 325 (65 Fe) mg tablet  Self, Family Member Yes No   Sig: Take 1 tablet every day by oral route in the evening.    Patient not taking: Reported on 7/31/2023   gabapentin (NEURONTIN) 400 mg capsule  Self, Family Member Yes No   Sig: Take 400 mg by mouth Three times a day   gemfibrozil (LOPID) 600 mg tablet  Self, Family Member Yes No   Sig: Take 600 mg by mouth 2 (two) times a day   insulin aspart (NovoLOG FlexPen) 100 UNIT/ML injection pen  Self, Family Member Yes No   Sig: inject 10 unit subcutaneously three times a day with food   Patient not taking: Reported on 7/31/2023   insulin aspart, w/niacinamide, (FIASP) 100 Units/mL injection pen  Self, Family Member Yes No Sig: Inject 10 units 3 times a day by subcutaneous route for 90 days.    insulin glargine (LANTUS SOLOSTAR) 100 units/mL injection pen  Self, Family Member Yes No   Sig: Inject 32 Units under the skin daily at bedtime    isosorbide dinitrate (ISORDIL) 30 mg tablet  Self, Family Member Yes No   Sig: Take 30 mg by mouth Three times a day   Patient not taking: Reported on 7/31/2023   isosorbide mononitrate (IMDUR) 30 mg 24 hr tablet  Self, Family Member Yes No   Sig: Take 30 mg by mouth daily   levothyroxine 25 mcg tablet  Self, Family Member Yes No   Sig: Take 25 mcg by mouth daily   lisinopril (ZESTRIL) 5 mg tablet  Self, Family Member Yes No   Sig: Take 5 mg by mouth daily   metFORMIN (GLUCOPHAGE) 500 mg tablet  Self, Family Member Yes No   Sig: Take 1 tablet by mouth 2 (two) times a day   methocarbamol (ROBAXIN) 500 mg tablet  Self, Family Member Yes No   Sig: Take 500 mg by mouth 4 (four) times a day as needed   Patient not taking: Reported on 7/31/2023   metoprolol succinate (TOPROL-XL) 25 mg 24 hr tablet  Self, Family Member Yes No   Sig: Take 25 mg by mouth daily   omeprazole (PriLOSEC) 20 mg delayed release capsule  Self, Family Member Yes No   Sig: Take 20 mg by mouth daily     omeprazole (PriLOSEC) 40 MG capsule  Self, Family Member Yes No   Sig: TAKE 1 CAPSULE BY MOUTH EVERY DAY 1/2 [address]   Patient not taking: Reported on 7/31/2023      Facility-Administered Medications: None       Past Medical History:   Diagnosis Date    Coronary artery disease involving native coronary artery of native heart without angina pectoris     Diabetes mellitus ([address])     Dyslipidemia     Essential hypertension     GERD (gastroesophageal reflux disease)     Hyperlipidemia     Hypertension     MI (myocardial infarction) ([address])     Myocardial infarction ([address])     SOB (shortness of breath)     cardiac cath today 10/18/2021    Type 2 diabetes mellitus ([address])     Wears glasses        Past Surgical History:   Procedure Laterality Date    CARDIAC CATHETERIZATION Left     Multiple catheterizations in , 2014 in 2017 in [facility] 10/18/2021    Procedure: Cardiac catheterization;  Surgeon: Vin Blankenship DO;  Location: [address] [place] [address] CATH LAB; Service: Cardiology    CATARACT EXTRACTION         History reviewed. No pertinent family history. I have reviewed and agree with the history as documented. E-Cigarette/Vaping    E-Cigarette Use Never User      E-Cigarette/Vaping Substances     Social History     Tobacco Use    Smoking status: Former     Types: Cigarettes     Quit date: 2001     Years since quittin.3    Smokeless tobacco: Never   Vaping Use    Vaping Use: Never used   Substance Use Topics    Alcohol use: Never    Drug use: Never        Review of Systems   Constitutional:  Negative for chills, fatigue and fever. HENT:  Negative for rhinorrhea and sore throat. Eyes: Negative. Respiratory:  Negative for cough and shortness of breath. Cardiovascular:  Negative for chest pain and palpitations. Gastrointestinal:  Negative for abdominal pain, constipation, diarrhea, nausea and vomiting. Genitourinary:  Negative for dysuria and hematuria. Musculoskeletal:  Negative for arthralgias and back pain. Skin:  Positive for rash. Negative for color change. Neurological:  Negative for seizures and syncope. All other systems reviewed and are negative. Physical Exam  ED Triage Vitals [23]   Temperature Pulse Respirations Blood Pressure SpO2   98.1 °F (36.7 °C) 82 18 144/96 98 %      Temp Source Heart Rate Source Patient Position - Orthostatic VS BP Location FiO2 (%)   Temporal -- -- -- --      Pain Score       10 - Worst Possible Pain             Orthostatic Vital Signs  Vitals:    23   BP: 144/96   Pulse: 82       Physical Exam  Vitals and nursing note reviewed. Constitutional:       General: She is not in acute distress.      Appearance: She is well-developed. HENT:      Head: Normocephalic and atraumatic. Eyes:      Conjunctiva/sclera: Conjunctivae normal.   Cardiovascular:      Rate and Rhythm: Normal rate and regular rhythm. Heart sounds: No murmur heard. Pulmonary:      Effort: Pulmonary effort is normal. No respiratory distress. Breath sounds: Normal breath sounds. Abdominal:      Palpations: Abdomen is soft. Tenderness: There is no abdominal tenderness. Musculoskeletal:         General: No swelling. Cervical back: Neck supple. Comments: Flaky, mildly erythematous rash present on palmar and dorsal aspects of both hands  Tender to palpation  Capillary refill preserved, less than 2 seconds  No sensory deficits  Full range of motion preserved   Skin:     General: Skin is warm and dry. Capillary Refill: Capillary refill takes less than 2 seconds. Neurological:      Mental Status: She is alert. Psychiatric:         Mood and Affect: Mood normal.         ED Medications  Medications   dexamethasone (DECADRON) tablet 6 mg (6 mg Oral Given 12/1/23 2154)       Diagnostic Studies  Results Reviewed       None                   No orders to display         Procedures  Procedures      ED Course                             SBIRT 20yo+      Flowsheet Row Most Recent Value   Initial Alcohol Screen: US AUDIT-C     1. How often do you have a drink containing alcohol? 0 Filed at: 12/01/2023 1944   2. How many drinks containing alcohol do you have on a typical day you are drinking? 0 Filed at: 12/01/2023 1944   3b. FEMALE Any Age, or MALE 65+: How often do you have 4 or more drinks on one occassion? 0 Filed at: 12/01/2023 1944   Audit-C Score 0 Filed at: 12/01/2023 1944   SHANE: How many times in the past year have you. .. Used an illegal drug or used a prescription medication for non-medical reasons?  Never Filed at: 12/01/2023 1944                  Medical Decision Making  Patient is presenting with likely contact dermatitis after exposure to child hands. Patient rash lacking warmth, doubt cellulitis, soft tissue infection. Will recommend emollients to give a one-time dose of steroids. Encourage follow-up with PCP. Return precautions, including worsening rash, erythema, swelling discussed. Patient expressed understanding of the follow-up instructions and return precautions. All questions answered prior to discharge. Disposition  Final diagnoses:   Contact dermatitis     Time reflects when diagnosis was documented in both MDM as applicable and the Disposition within this note       Time User Action Codes Description Comment    12/1/2023 11:33 PM Dat Song Add [L25.9] Contact dermatitis           ED Disposition       ED Disposition   Discharge    Condition   Stable    Date/Time   Fri Dec 1, 2023 2149    Comment   Keyana Daniels discharge to home/self care.                    Follow-up Information    None         Discharge Medication List as of 12/1/2023  9:50 PM        CONTINUE these medications which have NOT CHANGED    Details   aspirin (ECOTRIN LOW STRENGTH) 81 mg EC tablet Take 81 mg by mouth daily, Historical Med      atorvastatin (LIPITOR) 80 mg tablet take 1 tablet by mouth once daily, Normal      B-D ULTRAFINE III SHORT PEN 31G X 8 MM MISC use 1 PEN NEEDLE to inject MEDICATION subcutaneously three times a day, Historical Med      clopidogrel (PLAVIX) 75 mg tablet Take 1 tablet (75 mg total) by mouth daily, Starting Thu 11/2/2023, Normal      ezetimibe (ZETIA) 10 mg tablet Take 1 tablet (10 mg total) by mouth daily, Starting Mon 4/17/2023, Until Sun 7/16/2023, Normal      fenofibrate (TRICOR) 145 mg tablet take 1 tablet by mouth once daily, Normal      ferrous sulfate 325 (65 Fe) mg tablet Take 1 tablet every day by oral route in the evening., Historical Med      gabapentin (NEURONTIN) 400 mg capsule Take 400 mg by mouth Three times a day, Historical Med      gemfibrozil (LOPID) 600 mg tablet Take 600 mg by mouth 2 (two) times a day, Starting Mon 7/24/2023, Historical Med      insulin aspart (NovoLOG FlexPen) 100 UNIT/ML injection pen inject 10 unit subcutaneously three times a day with food, Historical Med      insulin aspart, w/niacinamide, (FIASP) 100 Units/mL injection pen Inject 10 units 3 times a day by subcutaneous route for 90 days. , Historical Med      insulin glargine (LANTUS SOLOSTAR) 100 units/mL injection pen Inject 32 Units under the skin daily at bedtime , Historical Med      isosorbide dinitrate (ISORDIL) 30 mg tablet Take 30 mg by mouth Three times a day, Historical Med      isosorbide mononitrate (IMDUR) 30 mg 24 hr tablet Take 30 mg by mouth daily, Starting Sun 7/9/2023, Historical Med      levothyroxine 25 mcg tablet Take 25 mcg by mouth daily, Historical Med      lisinopril (ZESTRIL) 5 mg tablet Take 5 mg by mouth daily, Historical Med      metFORMIN (GLUCOPHAGE) 500 mg tablet Take 1 tablet by mouth 2 (two) times a day, Starting Mon 3/21/2022, Historical Med      methocarbamol (ROBAXIN) 500 mg tablet Take 500 mg by mouth 4 (four) times a day as needed, Starting Mon 5/10/2021, Historical Med      metoprolol succinate (TOPROL-XL) 25 mg 24 hr tablet Take 25 mg by mouth daily, Historical Med      !! omeprazole (PriLOSEC) 20 mg delayed release capsule Take 20 mg by mouth daily  , Historical Med      !! omeprazole (PriLOSEC) 40 MG capsule TAKE 1 CAPSULE BY MOUTH EVERY DAY 1/2 HOUR BEFORE BREAKFAST, Historical Med       !! - Potential duplicate medications found. Please discuss with provider. No discharge procedures on file. PDMP Review       None             ED Provider  Attending physically available and evaluated Freddy Luna. I managed the patient along with the ED Attending.     Electronically Signed by           Kimmy Stallings MD  12/01/23 1570

## 2023-12-02 NOTE — DISCHARGE INSTRUCTIONS
You have been evaluated in the emergency department for hand pain. It is likely that your hands are swollen and inflamed from exposure to irritating compounds, such as bleach. Always wear gloves when you clean. This includes when you are cleaning dishes at home. Use topical emollient such as Eucerin and Vaseline frequently. You can pick these up over-the-counter. Please follow-up with your PCP for recheck within a week. Return to the emergency department if your symptoms worsen despite treatment.

## 2024-03-21 DIAGNOSIS — E78.5 DYSLIPIDEMIA: ICD-10-CM

## 2024-03-21 RX ORDER — EZETIMIBE 10 MG/1
10 TABLET ORAL DAILY
Qty: 30 TABLET | Refills: 11 | Status: SHIPPED | OUTPATIENT
Start: 2024-03-21

## 2024-07-04 ENCOUNTER — HOSPITAL ENCOUNTER (OUTPATIENT)
Facility: HOSPITAL | Age: 64
Setting detail: OBSERVATION
Discharge: HOME | End: 2024-07-05
Attending: EMERGENCY MEDICINE | Admitting: FAMILY MEDICINE
Payer: COMMERCIAL

## 2024-07-04 ENCOUNTER — APPOINTMENT (OUTPATIENT)
Dept: RADIOLOGY | Facility: HOSPITAL | Age: 64
End: 2024-07-04
Payer: COMMERCIAL

## 2024-07-04 ENCOUNTER — APPOINTMENT (EMERGENCY)
Dept: RADIOLOGY | Facility: HOSPITAL | Age: 64
End: 2024-07-04
Payer: COMMERCIAL

## 2024-07-04 DIAGNOSIS — E11.9 DM (DIABETES MELLITUS) (HCC): ICD-10-CM

## 2024-07-04 DIAGNOSIS — R93.89 IMAGING ABNORMALITIES: ICD-10-CM

## 2024-07-04 DIAGNOSIS — M62.838 MUSCLE SPASMS OF NECK: ICD-10-CM

## 2024-07-04 DIAGNOSIS — R07.9 CHEST PAIN: Primary | ICD-10-CM

## 2024-07-04 DIAGNOSIS — I25.10 CAD (CORONARY ARTERY DISEASE): ICD-10-CM

## 2024-07-04 DIAGNOSIS — I10 HTN (HYPERTENSION): ICD-10-CM

## 2024-07-04 DIAGNOSIS — E78.5 HLD (HYPERLIPIDEMIA): ICD-10-CM

## 2024-07-04 PROBLEM — R07.89 OTHER CHEST PAIN: Status: ACTIVE | Noted: 2024-07-04

## 2024-07-04 PROBLEM — M25.512 LEFT SHOULDER PAIN: Status: ACTIVE | Noted: 2024-07-04

## 2024-07-04 PROBLEM — Z79.4 TYPE 2 DIABETES MELLITUS, WITH LONG-TERM CURRENT USE OF INSULIN (HCC): Status: ACTIVE | Noted: 2022-11-16

## 2024-07-04 PROBLEM — E03.9 ACQUIRED HYPOTHYROIDISM: Status: ACTIVE | Noted: 2024-07-04

## 2024-07-04 PROBLEM — E78.2 MIXED HYPERLIPIDEMIA: Status: ACTIVE | Noted: 2024-07-04

## 2024-07-04 PROBLEM — R91.1 LUNG NODULE SEEN ON IMAGING STUDY: Status: ACTIVE | Noted: 2024-07-04

## 2024-07-04 LAB
2HR DELTA HS TROPONIN: 0 NG/L
4HR DELTA HS TROPONIN: 1 NG/L
ALBUMIN SERPL BCG-MCNC: 4.3 G/DL (ref 3.5–5)
ALP SERPL-CCNC: 88 U/L (ref 34–104)
ALT SERPL W P-5'-P-CCNC: 17 U/L (ref 7–52)
ANION GAP SERPL CALCULATED.3IONS-SCNC: 6 MMOL/L (ref 4–13)
AST SERPL W P-5'-P-CCNC: 16 U/L (ref 13–39)
ATRIAL RATE: 55 BPM
ATRIAL RATE: 75 BPM
BASOPHILS # BLD AUTO: 0.02 THOUSANDS/ÂΜL (ref 0–0.1)
BASOPHILS NFR BLD AUTO: 0 % (ref 0–1)
BILIRUB SERPL-MCNC: 0.62 MG/DL (ref 0.2–1)
BUN SERPL-MCNC: 18 MG/DL (ref 5–25)
CALCIUM SERPL-MCNC: 9.2 MG/DL (ref 8.4–10.2)
CARDIAC TROPONIN I PNL SERPL HS: 5 NG/L
CARDIAC TROPONIN I PNL SERPL HS: 5 NG/L
CARDIAC TROPONIN I PNL SERPL HS: 6 NG/L
CHLORIDE SERPL-SCNC: 104 MMOL/L (ref 96–108)
CO2 SERPL-SCNC: 28 MMOL/L (ref 21–32)
CREAT SERPL-MCNC: 0.98 MG/DL (ref 0.6–1.3)
EOSINOPHIL # BLD AUTO: 0.08 THOUSAND/ÂΜL (ref 0–0.61)
EOSINOPHIL NFR BLD AUTO: 1 % (ref 0–6)
ERYTHROCYTE [DISTWIDTH] IN BLOOD BY AUTOMATED COUNT: 11.5 % (ref 11.6–15.1)
GFR SERPL CREATININE-BSD FRML MDRD: 61 ML/MIN/1.73SQ M
GLUCOSE SERPL-MCNC: 146 MG/DL (ref 65–140)
GLUCOSE SERPL-MCNC: 159 MG/DL (ref 65–140)
GLUCOSE SERPL-MCNC: 195 MG/DL (ref 65–140)
HCT VFR BLD AUTO: 40.2 % (ref 34.8–46.1)
HGB BLD-MCNC: 13.1 G/DL (ref 11.5–15.4)
IMM GRANULOCYTES # BLD AUTO: 0.01 THOUSAND/UL (ref 0–0.2)
IMM GRANULOCYTES NFR BLD AUTO: 0 % (ref 0–2)
LYMPHOCYTES # BLD AUTO: 2.43 THOUSANDS/ÂΜL (ref 0.6–4.47)
LYMPHOCYTES NFR BLD AUTO: 43 % (ref 14–44)
MCH RBC QN AUTO: 29.8 PG (ref 26.8–34.3)
MCHC RBC AUTO-ENTMCNC: 32.6 G/DL (ref 31.4–37.4)
MCV RBC AUTO: 92 FL (ref 82–98)
MONOCYTES # BLD AUTO: 0.38 THOUSAND/ÂΜL (ref 0.17–1.22)
MONOCYTES NFR BLD AUTO: 7 % (ref 4–12)
NEUTROPHILS # BLD AUTO: 2.74 THOUSANDS/ÂΜL (ref 1.85–7.62)
NEUTS SEG NFR BLD AUTO: 49 % (ref 43–75)
NRBC BLD AUTO-RTO: 0 /100 WBCS
P AXIS: 49 DEGREES
P AXIS: 61 DEGREES
PLATELET # BLD AUTO: 265 THOUSANDS/UL (ref 149–390)
PMV BLD AUTO: 9.2 FL (ref 8.9–12.7)
POTASSIUM SERPL-SCNC: 3.7 MMOL/L (ref 3.5–5.3)
PR INTERVAL: 144 MS
PR INTERVAL: 158 MS
PROT SERPL-MCNC: 7.3 G/DL (ref 6.4–8.4)
QRS AXIS: -6 DEGREES
QRS AXIS: 69 DEGREES
QRSD INTERVAL: 114 MS
QRSD INTERVAL: 92 MS
QT INTERVAL: 416 MS
QT INTERVAL: 430 MS
QTC INTERVAL: 411 MS
QTC INTERVAL: 464 MS
RBC # BLD AUTO: 4.39 MILLION/UL (ref 3.81–5.12)
SODIUM SERPL-SCNC: 138 MMOL/L (ref 135–147)
T WAVE AXIS: 60 DEGREES
T WAVE AXIS: 80 DEGREES
VENTRICULAR RATE: 55 BPM
VENTRICULAR RATE: 75 BPM
WBC # BLD AUTO: 5.66 THOUSAND/UL (ref 4.31–10.16)

## 2024-07-04 PROCEDURE — 84484 ASSAY OF TROPONIN QUANT: CPT | Performed by: EMERGENCY MEDICINE

## 2024-07-04 PROCEDURE — 96374 THER/PROPH/DIAG INJ IV PUSH: CPT

## 2024-07-04 PROCEDURE — 99223 1ST HOSP IP/OBS HIGH 75: CPT | Performed by: FAMILY MEDICINE

## 2024-07-04 PROCEDURE — 72125 CT NECK SPINE W/O DYE: CPT

## 2024-07-04 PROCEDURE — 82948 REAGENT STRIP/BLOOD GLUCOSE: CPT

## 2024-07-04 PROCEDURE — 93005 ELECTROCARDIOGRAM TRACING: CPT

## 2024-07-04 PROCEDURE — 85025 COMPLETE CBC W/AUTO DIFF WBC: CPT | Performed by: EMERGENCY MEDICINE

## 2024-07-04 PROCEDURE — 93010 ELECTROCARDIOGRAM REPORT: CPT | Performed by: INTERNAL MEDICINE

## 2024-07-04 PROCEDURE — 36415 COLL VENOUS BLD VENIPUNCTURE: CPT

## 2024-07-04 PROCEDURE — 74177 CT ABD & PELVIS W/CONTRAST: CPT

## 2024-07-04 PROCEDURE — 73030 X-RAY EXAM OF SHOULDER: CPT

## 2024-07-04 PROCEDURE — 70450 CT HEAD/BRAIN W/O DYE: CPT

## 2024-07-04 PROCEDURE — 71045 X-RAY EXAM CHEST 1 VIEW: CPT

## 2024-07-04 PROCEDURE — 71260 CT THORAX DX C+: CPT

## 2024-07-04 PROCEDURE — 99285 EMERGENCY DEPT VISIT HI MDM: CPT | Performed by: EMERGENCY MEDICINE

## 2024-07-04 PROCEDURE — 99285 EMERGENCY DEPT VISIT HI MDM: CPT

## 2024-07-04 PROCEDURE — 80053 COMPREHEN METABOLIC PANEL: CPT | Performed by: EMERGENCY MEDICINE

## 2024-07-04 RX ORDER — HEPARIN SODIUM 5000 [USP'U]/ML
5000 INJECTION, SOLUTION INTRAVENOUS; SUBCUTANEOUS EVERY 8 HOURS SCHEDULED
Status: DISCONTINUED | OUTPATIENT
Start: 2024-07-04 | End: 2024-07-05 | Stop reason: HOSPADM

## 2024-07-04 RX ORDER — GEMFIBROZIL 600 MG/1
600 TABLET, FILM COATED ORAL 2 TIMES DAILY
Status: DISCONTINUED | OUTPATIENT
Start: 2024-07-04 | End: 2024-07-04

## 2024-07-04 RX ORDER — ACETAMINOPHEN 325 MG/1
975 TABLET ORAL ONCE
Status: COMPLETED | OUTPATIENT
Start: 2024-07-04 | End: 2024-07-04

## 2024-07-04 RX ORDER — INSULIN LISPRO 100 [IU]/ML
5 INJECTION, SOLUTION INTRAVENOUS; SUBCUTANEOUS
Status: DISCONTINUED | OUTPATIENT
Start: 2024-07-04 | End: 2024-07-05 | Stop reason: HOSPADM

## 2024-07-04 RX ORDER — PANTOPRAZOLE SODIUM 40 MG/1
40 TABLET, DELAYED RELEASE ORAL
Status: DISCONTINUED | OUTPATIENT
Start: 2024-07-05 | End: 2024-07-05 | Stop reason: HOSPADM

## 2024-07-04 RX ORDER — ATORVASTATIN CALCIUM 80 MG/1
80 TABLET, FILM COATED ORAL DAILY
Status: DISCONTINUED | OUTPATIENT
Start: 2024-07-05 | End: 2024-07-05 | Stop reason: HOSPADM

## 2024-07-04 RX ORDER — LIDOCAINE 50 MG/G
1 PATCH TOPICAL ONCE
Status: COMPLETED | OUTPATIENT
Start: 2024-07-04 | End: 2024-07-05

## 2024-07-04 RX ORDER — INSULIN LISPRO 100 [IU]/ML
1-6 INJECTION, SOLUTION INTRAVENOUS; SUBCUTANEOUS
Status: DISCONTINUED | OUTPATIENT
Start: 2024-07-04 | End: 2024-07-05 | Stop reason: HOSPADM

## 2024-07-04 RX ORDER — GABAPENTIN 300 MG/1
300 CAPSULE ORAL 3 TIMES DAILY
Status: DISCONTINUED | OUTPATIENT
Start: 2024-07-04 | End: 2024-07-05 | Stop reason: HOSPADM

## 2024-07-04 RX ORDER — LEVOTHYROXINE SODIUM 0.03 MG/1
25 TABLET ORAL
Status: DISCONTINUED | OUTPATIENT
Start: 2024-07-05 | End: 2024-07-05 | Stop reason: HOSPADM

## 2024-07-04 RX ORDER — FENOFIBRATE 145 MG/1
145 TABLET, COATED ORAL DAILY
Status: DISCONTINUED | OUTPATIENT
Start: 2024-07-05 | End: 2024-07-04

## 2024-07-04 RX ORDER — MAGNESIUM HYDROXIDE/ALUMINUM HYDROXICE/SIMETHICONE 120; 1200; 1200 MG/30ML; MG/30ML; MG/30ML
30 SUSPENSION ORAL EVERY 6 HOURS PRN
Status: DISCONTINUED | OUTPATIENT
Start: 2024-07-04 | End: 2024-07-05 | Stop reason: HOSPADM

## 2024-07-04 RX ORDER — METOPROLOL SUCCINATE 25 MG/1
25 TABLET, EXTENDED RELEASE ORAL DAILY
Status: DISCONTINUED | OUTPATIENT
Start: 2024-07-05 | End: 2024-07-05 | Stop reason: HOSPADM

## 2024-07-04 RX ORDER — INSULIN LISPRO 100 [IU]/ML
2-12 INJECTION, SOLUTION INTRAVENOUS; SUBCUTANEOUS
Status: DISCONTINUED | OUTPATIENT
Start: 2024-07-04 | End: 2024-07-05 | Stop reason: HOSPADM

## 2024-07-04 RX ORDER — INSULIN GLARGINE 100 [IU]/ML
25 INJECTION, SOLUTION SUBCUTANEOUS
Status: DISCONTINUED | OUTPATIENT
Start: 2024-07-04 | End: 2024-07-05 | Stop reason: HOSPADM

## 2024-07-04 RX ORDER — ONDANSETRON 2 MG/ML
4 INJECTION INTRAMUSCULAR; INTRAVENOUS EVERY 6 HOURS PRN
Status: DISCONTINUED | OUTPATIENT
Start: 2024-07-04 | End: 2024-07-05 | Stop reason: HOSPADM

## 2024-07-04 RX ORDER — LISINOPRIL 5 MG/1
5 TABLET ORAL DAILY
Status: DISCONTINUED | OUTPATIENT
Start: 2024-07-05 | End: 2024-07-04

## 2024-07-04 RX ORDER — EZETIMIBE 10 MG/1
10 TABLET ORAL DAILY
Status: DISCONTINUED | OUTPATIENT
Start: 2024-07-05 | End: 2024-07-04

## 2024-07-04 RX ORDER — KETOROLAC TROMETHAMINE 30 MG/ML
15 INJECTION, SOLUTION INTRAMUSCULAR; INTRAVENOUS ONCE
Status: COMPLETED | OUTPATIENT
Start: 2024-07-04 | End: 2024-07-04

## 2024-07-04 RX ORDER — ISOSORBIDE MONONITRATE 30 MG/1
30 TABLET, EXTENDED RELEASE ORAL DAILY
Status: DISCONTINUED | OUTPATIENT
Start: 2024-07-05 | End: 2024-07-05 | Stop reason: HOSPADM

## 2024-07-04 RX ORDER — ACETAMINOPHEN 325 MG/1
650 TABLET ORAL EVERY 6 HOURS PRN
Status: DISCONTINUED | OUTPATIENT
Start: 2024-07-04 | End: 2024-07-05 | Stop reason: HOSPADM

## 2024-07-04 RX ORDER — GABAPENTIN 400 MG/1
400 CAPSULE ORAL 3 TIMES DAILY
Status: DISCONTINUED | OUTPATIENT
Start: 2024-07-04 | End: 2024-07-04

## 2024-07-04 RX ORDER — CLOPIDOGREL BISULFATE 75 MG/1
75 TABLET ORAL DAILY
Status: DISCONTINUED | OUTPATIENT
Start: 2024-07-05 | End: 2024-07-05 | Stop reason: HOSPADM

## 2024-07-04 RX ADMIN — INSULIN GLARGINE 25 UNITS: 100 INJECTION, SOLUTION SUBCUTANEOUS at 22:04

## 2024-07-04 RX ADMIN — HEPARIN SODIUM 5000 UNITS: 5000 INJECTION INTRAVENOUS; SUBCUTANEOUS at 22:04

## 2024-07-04 RX ADMIN — INSULIN LISPRO 2 UNITS: 100 INJECTION, SOLUTION INTRAVENOUS; SUBCUTANEOUS at 22:04

## 2024-07-04 RX ADMIN — LIDOCAINE 1 PATCH: 700 PATCH TOPICAL at 13:55

## 2024-07-04 RX ADMIN — ACETAMINOPHEN 975 MG: 325 TABLET, FILM COATED ORAL at 13:54

## 2024-07-04 RX ADMIN — IOHEXOL 100 ML: 350 INJECTION, SOLUTION INTRAVENOUS at 14:19

## 2024-07-04 RX ADMIN — INSULIN LISPRO 5 UNITS: 100 INJECTION, SOLUTION INTRAVENOUS; SUBCUTANEOUS at 18:37

## 2024-07-04 RX ADMIN — KETOROLAC TROMETHAMINE 15 MG: 30 INJECTION, SOLUTION INTRAMUSCULAR at 13:54

## 2024-07-04 RX ADMIN — INSULIN LISPRO 2 UNITS: 100 INJECTION, SOLUTION INTRAVENOUS; SUBCUTANEOUS at 18:37

## 2024-07-04 NOTE — H&P
[facility]  H&P  Name: Vani Tay 63 y.o. female I MRN: [id number]  Unit/Bed#: QCC I Date of Admission: 7/4/2024   Date of Service: 7/4/2024 I Hospital Day: 0      Assessment & Plan   * Other chest pain  Assessment & Plan  63-year-old female with PMH of CAD s/p drug-eluting stent in multiple vessels, presented to ED with complaint of left-sided chest pain, left shoulder pain, and left arm pain.  Initial EKG does not reveal any ST wave elevation or depression.  Troponin x 2 negative.  On exam, patient has left-sided chest wall tenderness, left shoulder tenderness, and worsening pain with left shoulder movement.  CT chest abdomen pelvis is negative for acute finding.  Placed on telemetry monitor, obtain echo.  Most likely musculoskeletal pain.  Obtain left shoulder x-ray.  Lidocaine patch, Tylenol    Imaging abnormalities  Assessment & Plan  Bilateral thyroid nodules, including a 1.7 cm left thyroid lobe nodule. Thyroid ultrasound recommended for further evaluation.   Septated hypodense right lower pole lesion with indeterminate internal density. Renal protocol CT versus MRI with contrast recommended for further evaluation.   Endometrium measures up to 8 mm, which is considered thickened in a postmenopausal patient. Pelvic ultrasound recommended for further evaluation.    Will need outpatient f/u with PCP    Lung nodule seen on imaging study  Assessment & Plan  Incidental finding on CT scan. Need OP F/u in 6 months    Acquired hypothyroidism  Assessment & Plan  Resume home levothyroxine    Essential hypertension  Assessment & Plan  Resume home medication metoprolol and isosorbide.  Will hold lisinopril due to very well-controlled BP    Mixed hyperlipidemia  Assessment & Plan  Resume home medication atorvastatin, ezetimibe, and fenofibrate    Left shoulder pain  Assessment & Plan  Obtain x-ray of the left shoulder  Pain control    Type 2 diabetes mellitus, with long-term  "current use of insulin (HCC)  Assessment & Plan  Lab Results   Component Value Date    HGBA1C 7.7 (H) 01/24/2024       No results for input(s): \"POCGLU\" in the last 72 hours.    Blood Sugar Average: Last 72 hrs:    Home regimen Lantus 35 units at bedtime, and aspart 10 units 3 times daily with meals.  While admitted, order Lantus 25 units at bedtime and Humalog 5 units 3 times daily with meals with sliding scale    CAD (coronary artery disease)  Assessment & Plan  Follows up with [facility] cardiology is Dr. Marin.  Continue aspirin, statin, metoprolol, Plavix           VTE Prophylaxis: Heparin  / sequential compression device   Code Status: full code   Discussion with family: son present at bedside    Anticipated Length of Stay:  Patient will be admitted on an Observation basis with an anticipated length of stay of  < 2 midnights.   Justification for Hospital Stay: chest pain    Total Time for Visit, including Counseling / Coordination of Care: 60 minutes.  Greater than 50% of this total time spent on direct patient counseling and coordination of care.    Chief Complaint:   chest pain    History of Present Illness:    Vani Tay is a 63 y.o. female who presents with 63-year-old female with PMH of CAD s/p drug-eluting stent in multiple vessels, presented to ED with complaint of left-sided chest pain, left shoulder pain, and left arm pain.  Initial EKG does not reveal any ST wave elevation or depression.  Troponin x 2 negative.  On exam, patient has left-sided chest wall tenderness, left shoulder tenderness, and worsening pain with left shoulder movement.  CT chest abdomen pelvis is negative for acute finding. Currently denies any dyspnea, nausea, vomiting. .    Review of Systems:    Review of Systems   Constitutional:  Negative for chills and fever.   HENT:  Negative for ear pain and sore throat.    Eyes:  Negative for pain and visual disturbance.   Respiratory:  Negative for cough and shortness of breath.  "   Cardiovascular:  Positive for chest pain. Negative for palpitations.   Gastrointestinal:  Negative for abdominal pain and vomiting.   Genitourinary:  Negative for dysuria and hematuria.   Musculoskeletal:  Positive for arthralgias, joint swelling and myalgias. Negative for back pain.   Skin:  Negative for color change and rash.   Neurological:  Negative for seizures and syncope.   All other systems reviewed and are negative.      Past Medical and Surgical History:     Past Medical History:   Diagnosis Date    Coronary artery disease involving native coronary artery of native heart without angina pectoris     Diabetes mellitus (HCC)     Dyslipidemia     Essential hypertension     GERD (gastroesophageal reflux disease)     Hyperlipidemia     Hypertension     MI (myocardial infarction) (HCC)     Myocardial infarction (HCC)     SOB (shortness of breath)     cardiac cath today 10/18/2021    Type 2 diabetes mellitus (HCC)     Wears glasses        Past Surgical History:   Procedure Laterality Date    CARDIAC CATHETERIZATION Left     Multiple catheterizations in 2011, 2014 in 2017 in [place]    CARDIAC CATHETERIZATION N/A 10/18/2021    Procedure: Cardiac catheterization;  Surgeon: Christopher Newton DO;  Location: [facility];  Service: Cardiology    CATARACT EXTRACTION         Meds/Allergies:    Prior to Admission medications    Medication Sig Start Date End Date Taking? Authorizing Provider   aspirin (ECOTRIN LOW STRENGTH) 81 mg EC tablet Take 81 mg by mouth daily    Historical Provider, MD   atorvastatin (LIPITOR) 80 mg tablet take 1 tablet by mouth once daily 3/18/22   Jose A Marin DO   B-D ULTRAFINE III SHORT PEN 31G X 8 MM MISC use 1 PEN NEEDLE to inject MEDICATION subcutaneously three times a day 8/6/22   Historical Provider, MD   clopidogrel (PLAVIX) 75 mg tablet Take 1 tablet (75 mg total) by mouth daily 11/2/23   Jose A Marin DO   ezetimibe (ZETIA) 10 mg tablet TAKE 1 TABLET BY  MOUTH EVERY DAY 3/21/24   Jose A Marin DO   fenofibrate (TRICOR) 145 mg tablet take 1 tablet by mouth once daily 3/18/22   Jose A Marin DO   gabapentin (NEURONTIN) 400 mg capsule Take 400 mg by mouth Three times a day    Historical Provider, MD   gemfibrozil (LOPID) 600 mg tablet Take 600 mg by mouth 2 (two) times a day 7/24/23   Historical Provider, MD   insulin aspart (NovoLOG FlexPen) 100 UNIT/ML injection pen inject 10 unit subcutaneously three times a day with food  Patient not taking: Reported on 7/31/2023 8/14/22   Historical Provider, MD   insulin aspart, w/niacinamide, (FIASP) 100 Units/mL injection pen Inject 10 units 3 times a day by subcutaneous route for 90 days. 3/21/22   Historical Provider, MD   insulin glargine (LANTUS SOLOSTAR) 100 units/mL injection pen Inject 32 Units under the skin daily at bedtime     Historical Provider, MD   isosorbide mononitrate (IMDUR) 30 mg 24 hr tablet Take 30 mg by mouth daily 7/9/23   Historical Provider, MD   levothyroxine 25 mcg tablet Take 25 mcg by mouth daily    Historical Provider, MD   lisinopril (ZESTRIL) 5 mg tablet Take 5 mg by mouth daily    Historical Provider, MD   metFORMIN (GLUCOPHAGE) 500 mg tablet Take 1 tablet by mouth 2 (two) times a day 3/21/22   Historical Provider, MD   methocarbamol (ROBAXIN) 500 mg tablet Take 500 mg by mouth 4 (four) times a day as needed  Patient not taking: Reported on 7/31/2023 5/10/21   Historical Provider, MD   metoprolol succinate (TOPROL-XL) 25 mg 24 hr tablet Take 25 mg by mouth daily    Historical Provider, MD   omeprazole (PriLOSEC) 20 mg delayed release capsule Take 20 mg by mouth daily      Historical Provider, MD   ferrous sulfate 325 (65 Fe) mg tablet Take 1 tablet every day by oral route in the evening.  Patient not taking: Reported on 7/31/2023 3/21/22 7/4/24  Historical Provider, MD   isosorbide dinitrate (ISORDIL) 30 mg tablet Take 30 mg by mouth Three times a day  Patient not taking: Reported  on 2023  Historical Provider, MD   omeprazole (PriLOSEC) 40 MG capsule TAKE 1 CAPSULE BY MOUTH EVERY DAY 1/2 HOUR BEFORE BREAKFAST  Patient not taking: Reported on 2023  Historical Provider, MD DUDLEY have reviewed home medications using allscripts.    Allergies: No Known Allergies    Social History:     Marital Status: Single      Substance Use History:   Social History     Substance and Sexual Activity   Alcohol Use Never     Social History     Tobacco Use   Smoking Status Former    Current packs/day: 0.00    Types: Cigarettes    Quit date: 2001    Years since quittin.9   Smokeless Tobacco Never     Social History     Substance and Sexual Activity   Drug Use Never       Family History:    History reviewed. No pertinent family history.    Physical Exam:     Vitals:   Blood Pressure: 133/65 (24 1700)  Pulse: 56 (24 1700)  Temperature: (!) 97.3 °F (36.3 °C) (24 1308)  Temp Source: Tympanic (24 1308)  Respirations: 20 (24 1700)  SpO2: 99 % (24 1700)    Physical Exam  Vitals and nursing note reviewed.   Constitutional:       General: She is not in acute distress.     Appearance: She is well-developed.   HENT:      Head: Normocephalic and atraumatic.   Eyes:      Conjunctiva/sclera: Conjunctivae normal.   Cardiovascular:      Rate and Rhythm: Normal rate and regular rhythm.      Heart sounds: No murmur heard.  Pulmonary:      Effort: Pulmonary effort is normal. No respiratory distress.      Breath sounds: Normal breath sounds.   Chest:      Comments: Left upper chest wall point tendneress, worsens with movement of shoulder.   Abdominal:      Palpations: Abdomen is soft.      Tenderness: There is no abdominal tenderness.   Musculoskeletal:         General: No swelling.      Cervical back: Neck supple.   Skin:     General: Skin is warm and dry.      Capillary Refill: Capillary refill takes less than 2 seconds.   Neurological:      Mental Status:  She is alert.   Psychiatric:         Mood and Affect: Mood normal.            Additional Data:     Lab Results: I have personally reviewed pertinent reports.      Results from last 7 days   Lab Units 07/04/24  1327   WBC Thousand/uL 5.66   HEMOGLOBIN g/dL 13.1   HEMATOCRIT % 40.2   PLATELETS Thousands/uL 265   SEGS PCT % 49   LYMPHO PCT % 43   MONO PCT % 7   EOS PCT % 1     Results from last 7 days   Lab Units 07/04/24  1327   SODIUM mmol/L 138   POTASSIUM mmol/L 3.7   CHLORIDE mmol/L 104   CO2 mmol/L 28   BUN mg/dL 18   CREATININE mg/dL 0.98   ANION GAP mmol/L 6   CALCIUM mg/dL 9.2   ALBUMIN g/dL 4.3   TOTAL BILIRUBIN mg/dL 0.62   ALK PHOS U/L 88   ALT U/L 17   AST U/L 16   GLUCOSE RANDOM mg/dL 146*                       Imaging: I have personally reviewed pertinent reports.      CT chest abdomen pelvis w contrast   Final Result by Blanco Allison MD (07/04 5246)      No acute/traumatic injury identified in the chest, abdomen or pelvis.      8 mm posterior left upper lobe nodule.   Based on current Fleischner Society 2017 Guidelines on incidental pulmonary nodule, follow-up non-contrast CT is recommended at 6-12 months from the initial examination and, if stable at that time, an additional follow-up is recommended for 18-24 months    from the initial examination.      Bilateral thyroid nodules, including a 1.7 cm left thyroid lobe nodule.   Thyroid ultrasound recommended for further evaluation.      Septated hypodense right lower pole lesion with indeterminate internal density.   Renal protocol CT versus MRI with contrast recommended for further evaluation.      Endometrium measures up to 8 mm, which is considered thickened in a postmenopausal patient.   Pelvic ultrasound recommended for further evaluation.      Hepatic steatosis.         The study was marked in EPIC for immediate notification.         Workstation performed: [id number]         CT head wo contrast   Final Result by Leatha Aguayo MD (07/04  1425)      No acute intracranial pathology. In particular, no intracranial hemorrhage or calvarial fracture.                     Workstation performed: [id number]         CT spine cervical without contrast   Final Result by Leatha Aguayo MD (07/04 1440)      No cervical spine fracture or traumatic malalignment.                     Workstation performed: [id number]         XR chest 1 view portable   Final Result by Neelam Worrell MD (07/04 1435)      No acute cardiopulmonary disease.            Workstation performed: [id number]         XR shoulder 2+ vw left    (Results Pending)        ** Please Note: This note has been constructed using a voice recognition system. **

## 2024-07-04 NOTE — ASSESSMENT & PLAN NOTE
"Lab Results   Component Value Date    HGBA1C 7.7 (H) 01/24/2024       No results for input(s): \"POCGLU\" in the last 72 hours.    Blood Sugar Average: Last 72 hrs:    Home regimen Lantus 35 units at bedtime, and aspart 10 units 3 times daily with meals.  While admitted, order Lantus 25 units at bedtime and Humalog 5 units 3 times daily with meals with sliding scale  "

## 2024-07-04 NOTE — ED ATTENDING ATTESTATION
"7/4/2024  I, Tori Larry MD, saw and evaluated the patient. I have discussed the patient with the resident/non-physician practitioner and agree with the resident's/non-physician practitioner's findings, Plan of Care, and MDM as documented in the resident's/non-physician practitioner's note, except where noted. All available labs and Radiology studies were reviewed.  I was present for key portions of any procedure(s) performed by the resident/non-physician practitioner and I was immediately available to provide assistance.       At this point I agree with the current assessment done in the Emergency Department.  I have conducted an independent evaluation of this patient a history and physical is as follows:    This is an evaluation of a 63-year-old female who presents to the emergency department complaining of chest pain.  Patient states that approximately 2 to 3 weeks ago she developed left-sided anterior chest pain with radiation to her left arm.  Notes that the pain is persistently there regardless of movement.  Occasionally felt some nausea with her discomfort but otherwise denies any dyspnea or shortness of breath.  Pain was described as \"strong\" not sharp otherwise.  Patient notes that she occasionally feels palpitations but not currently.  3 days ago patient did also slip while walking down the steps.  She states that she does not believe she hit her head no LOC but did strike the left side of her body while going down the steps.  Since that time she has had ongoing pain in her chest and ribs that has worsened.  Otherwise denies any nausea or vomiting.  No new shortness of breath lightheadedness dizziness.  No focal numbness weakness or tingling.  Of note patient does have a significant cardiac history including previous MI and stent placement.  She is on Plavix. PE, NAD, VSS, NC/AT, MMM, neck supple.FROM, diffuse ttp over cspine without midline ttp, - stepoff or deformity, RR, nontt over " anterior/lateral chest, lungs CTAB, abd soft, +BS, -r/g, + ttp over L hip without contusions/abrasions, FROM, FROM L shoulder/upper extremity, intact pulses, AAO.  Assessment and plan 63-year-old female with chest pain as well as fall on Plavix.  Will do cardiac evaluation given patient's significant cardiac history as well as labs and trauma imaging.    ED Course     EKG with changes from previous in charting.  Given patient's risk factors and EKG changes consideration of admission.    Critical Care Time  Procedures

## 2024-07-04 NOTE — ED PROVIDER NOTES
"History  Chief Complaint   Patient presents with    Chest Pain     Pt fell onto L side chest 3 days ago, c/o L arm pain and CP since then, on plavix, - headstrike, hx MI     Patient is a 63-year-old female with history of CAD, diabetes, hyperlipidemia, and hypertension who presents for chest pain.  Patient states that for the past 2 weeks she has been having left-sided chest pain that she describes as sharp and radiating into her left arm.  She says the pain was intermittent but was not changing in intensity during that time period.  This was associated with \"tachycardia\" but is unclear if this was associated with palpitations as patient's son is translating and is not quite sure either.  She sustained a mechanical fall 3 days ago in which she slipped on the carpet of her stairs and fell down about half a flight of stairs striking her left side.  She denies any head strike or loss of conscious.  She is on Plavix.  She states that since that time her chest pain has been worsening.  Patient was concerned because she feel like this chest pain feels similar to the pain she had with her previous MI.  She states that since the fall she has also been experiencing left shoulder pain and left chest wall pain.        Prior to Admission Medications   Prescriptions Last Dose Informant Patient Reported? Taking?   B-D ULTRAFINE III SHORT PEN 31G X 8 MM MISC  Self, Family Member Yes No   Sig: use 1 PEN NEEDLE to inject MEDICATION subcutaneously three times a day   aspirin (ECOTRIN LOW STRENGTH) 81 mg EC tablet  Self, Family Member Yes No   Sig: Take 81 mg by mouth daily   atorvastatin (LIPITOR) 80 mg tablet  Self, Family Member No No   Sig: take 1 tablet by mouth once daily   clopidogrel (PLAVIX) 75 mg tablet   No No   Sig: Take 1 tablet (75 mg total) by mouth daily   ezetimibe (ZETIA) 10 mg tablet   No No   Sig: TAKE 1 TABLET BY MOUTH EVERY DAY   fenofibrate (TRICOR) 145 mg tablet  Self, Family Member No No   Sig: take 1 tablet " by mouth once daily   gabapentin (NEURONTIN) 400 mg capsule  Self, Family Member Yes No   Sig: Take 400 mg by mouth Three times a day   gemfibrozil (LOPID) 600 mg tablet  Self, Family Member Yes No   Sig: Take 600 mg by mouth 2 (two) times a day   insulin aspart (NovoLOG FlexPen) 100 UNIT/ML injection pen  Self, Family Member Yes No   Sig: inject 10 unit subcutaneously three times a day with food   Patient not taking: Reported on 7/31/2023   insulin aspart, w/niacinamide, (FIASP) 100 Units/mL injection pen  Self, Family Member Yes No   Sig: Inject 10 units 3 times a day by subcutaneous route for 90 days.   insulin glargine (LANTUS SOLOSTAR) 100 units/mL injection pen  Self, Family Member Yes No   Sig: Inject 32 Units under the skin daily at bedtime    isosorbide mononitrate (IMDUR) 30 mg 24 hr tablet  Self, Family Member Yes No   Sig: Take 30 mg by mouth daily   levothyroxine 25 mcg tablet  Self, Family Member Yes No   Sig: Take 25 mcg by mouth daily   lisinopril (ZESTRIL) 5 mg tablet  Self, Family Member Yes No   Sig: Take 5 mg by mouth daily   metFORMIN (GLUCOPHAGE) 500 mg tablet  Self, Family Member Yes No   Sig: Take 1 tablet by mouth 2 (two) times a day   methocarbamol (ROBAXIN) 500 mg tablet  Self, Family Member Yes No   Sig: Take 500 mg by mouth 4 (four) times a day as needed   Patient not taking: Reported on 7/31/2023   metoprolol succinate (TOPROL-XL) 25 mg 24 hr tablet  Self, Family Member Yes No   Sig: Take 25 mg by mouth daily   omeprazole (PriLOSEC) 20 mg delayed release capsule  Self, Family Member Yes No   Sig: Take 20 mg by mouth daily        Facility-Administered Medications: None       Past Medical History:   Diagnosis Date    Coronary artery disease involving native coronary artery of native heart without angina pectoris     Diabetes mellitus (HCC)     Dyslipidemia     Essential hypertension     GERD (gastroesophageal reflux disease)     Hyperlipidemia     Hypertension     MI (myocardial  infarction) (HCC)     Myocardial infarction (HCC)     SOB (shortness of breath)     cardiac cath today 10/18/2021    Type 2 diabetes mellitus (HCC)     Wears glasses        Past Surgical History:   Procedure Laterality Date    CARDIAC CATHETERIZATION Left     Multiple catheterizations in 2011, 2014 in 2017 in [place]    CARDIAC CATHETERIZATION N/A 10/18/2021    Procedure: Cardiac catheterization;  Surgeon: Christopher Newton DO;  Location: [facility];  Service: Cardiology    CATARACT EXTRACTION         History reviewed. No pertinent family history.  I have reviewed and agree with the history as documented.    E-Cigarette/Vaping    E-Cigarette Use Never User      E-Cigarette/Vaping Substances     Social History     Tobacco Use    Smoking status: Former     Current packs/day: 0.00     Types: Cigarettes     Quit date: 2001     Years since quittin.9    Smokeless tobacco: Never   Vaping Use    Vaping status: Never Used   Substance Use Topics    Alcohol use: Never    Drug use: Never        Review of Systems   Constitutional:  Negative for chills and fever.   HENT:  Negative for congestion and sore throat.    Eyes:  Negative for pain and visual disturbance.   Respiratory:  Negative for cough and shortness of breath.    Cardiovascular:  Positive for chest pain. Negative for palpitations and leg swelling.   Gastrointestinal:  Positive for abdominal pain. Negative for constipation, diarrhea, nausea and vomiting.   Genitourinary:  Negative for dysuria and hematuria.   Musculoskeletal:  Positive for arthralgias. Negative for back pain and neck pain.   Skin:  Negative for color change and rash.   Neurological:  Negative for weakness and numbness.   All other systems reviewed and are negative.      Physical Exam  ED Triage Vitals   Temperature Pulse Respirations Blood Pressure SpO2   24 1308 24 1308 24 1308 24 1308 24 1308   (!) 97.3 °F (36.3 °C) 74 18 165/88 98 %      Temp  Source Heart Rate Source Patient Position - Orthostatic VS BP Location FiO2 (%)   07/04/24 1308 07/04/24 1308 07/04/24 1308 07/04/24 1308 --   Tympanic Monitor Sitting Left arm       Pain Score       07/04/24 1354       9             Orthostatic Vital Signs  Vitals:    07/05/24 0300 07/05/24 0400 07/05/24 0500 07/05/24 0600   BP: 126/58 124/56 108/57 113/52   Pulse: (!) 54 (!) 50 57 (!) 53   Patient Position - Orthostatic VS:           Physical Exam  Vitals and nursing note reviewed.   Constitutional:       General: She is not in acute distress.     Appearance: Normal appearance. She is well-developed. She is obese. She is not ill-appearing, toxic-appearing or diaphoretic.   HENT:      Head: Normocephalic and atraumatic.      Right Ear: External ear normal.      Left Ear: External ear normal.      Nose: Nose normal. No congestion or rhinorrhea.      Mouth/Throat:      Mouth: Mucous membranes are moist.      Pharynx: Oropharynx is clear. No oropharyngeal exudate or posterior oropharyngeal erythema.   Eyes:      General: No scleral icterus.        Right eye: No discharge.         Left eye: No discharge.      Extraocular Movements: Extraocular movements intact.      Conjunctiva/sclera: Conjunctivae normal.      Pupils: Pupils are equal, round, and reactive to light.   Cardiovascular:      Rate and Rhythm: Normal rate and regular rhythm.      Pulses: Normal pulses.           Radial pulses are 2+ on the right side and 2+ on the left side.      Heart sounds: Normal heart sounds. No murmur heard.     No friction rub. No gallop.   Pulmonary:      Effort: Pulmonary effort is normal. No tachypnea, accessory muscle usage or respiratory distress.      Breath sounds: Normal breath sounds. No stridor. No decreased breath sounds, wheezing, rhonchi or rales.   Chest:      Chest wall: Tenderness (Tenderness to palpation of the anterior and left lateral chest wall) present. No crepitus.   Abdominal:      General: Abdomen is flat.  Bowel sounds are normal. There is no distension.      Palpations: Abdomen is soft.      Tenderness: There is abdominal tenderness (Mild diffuse). There is no right CVA tenderness, guarding or rebound.   Musculoskeletal:         General: No tenderness. Normal range of motion.      Cervical back: Neck supple.      Right lower leg: No tenderness. No edema.      Left lower leg: No tenderness. No edema.      Comments: There is mild tenderness palpation of the left shoulder.  There is full range of motion of the left shoulder and left elbow.  There is full strength and sensation of the bilateral upper extremities.   Skin:     General: Skin is warm and dry.      Capillary Refill: Capillary refill takes less than 2 seconds.      Coloration: Skin is not jaundiced or pale.   Neurological:      General: No focal deficit present.      Mental Status: She is alert and oriented to person, place, and time.      Cranial Nerves: No cranial nerve deficit.      Sensory: No sensory deficit.      Motor: No weakness.   Psychiatric:         Mood and Affect: Mood normal.         Behavior: Behavior normal.         ED Medications  Medications   aspirin (ECOTRIN LOW STRENGTH) EC tablet 81 mg (has no administration in time range)   atorvastatin (LIPITOR) tablet 80 mg (has no administration in time range)   clopidogrel (PLAVIX) tablet 75 mg (has no administration in time range)   insulin lispro (HumALOG/ADMELOG) 100 units/mL subcutaneous injection 5 Units (5 Units Subcutaneous Given 7/4/24 1837)   insulin glargine (LANTUS) subcutaneous injection 25 Units 0.25 mL (25 Units Subcutaneous Given 7/4/24 2204)   isosorbide mononitrate (IMDUR) 24 hr tablet 30 mg (has no administration in time range)   levothyroxine tablet 25 mcg (25 mcg Oral Given 7/5/24 0533)   metoprolol succinate (TOPROL-XL) 24 hr tablet 25 mg (has no administration in time range)   pantoprazole (PROTONIX) EC tablet 40 mg (40 mg Oral Given 7/5/24 0533)   aluminum-magnesium  hydroxide-simethicone (MAALOX) oral suspension 30 mL (has no administration in time range)   ondansetron (ZOFRAN) injection 4 mg (has no administration in time range)   acetaminophen (TYLENOL) tablet 650 mg (has no administration in time range)   heparin (porcine) subcutaneous injection 5,000 Units (5,000 Units Subcutaneous Given 7/5/24 0533)   insulin lispro (HumALOG/ADMELOG) 100 units/mL subcutaneous injection 2-12 Units (2 Units Subcutaneous Given 7/4/24 1837)   insulin lispro (HumALOG/ADMELOG) 100 units/mL subcutaneous injection 1-6 Units (2 Units Subcutaneous Given 7/4/24 2204)   fenofibrate (TRICOR) tablet 145 mg (has no administration in time range)   ezetimibe (ZETIA) tablet 10 mg (10 mg Oral Given 7/5/24 0115)   gabapentin (NEURONTIN) capsule 300 mg (300 mg Oral Not Given 7/4/24 2130)   ketorolac (TORADOL) injection 15 mg (15 mg Intravenous Given 7/4/24 1354)   acetaminophen (TYLENOL) tablet 975 mg (975 mg Oral Given 7/4/24 1354)   lidocaine (LIDODERM) 5 % patch 1 patch (1 patch Topical Patch Removed 7/5/24 0116)   iohexol (OMNIPAQUE) 350 MG/ML injection (MULTI-DOSE) 100 mL (100 mL Intravenous Given 7/4/24 1419)       Diagnostic Studies  Results Reviewed       Procedure Component Value Units Date/Time    Basic metabolic panel [[id number]] Collected: 07/05/24 0540    Lab Status: Final result Specimen: Blood from Arm, Right Updated: 07/05/24 0615     Sodium 140 mmol/L      Potassium 4.0 mmol/L      Chloride 106 mmol/L      CO2 29 mmol/L      ANION GAP 5 mmol/L      BUN 23 mg/dL      Creatinine 1.03 mg/dL      Glucose 86 mg/dL      Calcium 9.3 mg/dL      eGFR 57 ml/min/1.73sq m     Narrative:      National Kidney Disease Foundation guidelines for Chronic Kidney Disease (CKD):     Stage 1 with normal or high GFR (GFR > 90 mL/min/1.73 square meters)    Stage 2 Mild CKD (GFR = 60-89 mL/min/1.73 square meters)    Stage 3A Moderate CKD (GFR = 45-59 mL/min/1.73 square meters)    Stage 3B Moderate CKD (GFR = 30-44  mL/min/1.73 square meters)    Stage 4 Severe CKD (GFR = 15-29 mL/min/1.73 square meters)    Stage 5 End Stage CKD (GFR <15 mL/min/1.73 square meters)  Note: GFR calculation is accurate only with a steady state creatinine    Hemoglobin A1c w/EAG Estimation (Prechecked if no A1C within 90 days) [[id number]]  (Abnormal) Collected: 07/05/24 0540    Lab Status: Final result Specimen: Blood from Arm, Right Updated: 07/05/24 0609     Hemoglobin A1C 8.8 %       mg/dl     CBC and differential [[id number]] Collected: 07/05/24 0540    Lab Status: Final result Specimen: Blood from Arm, Right Updated: 07/05/24 0551     WBC 5.72 Thousand/uL      RBC 3.99 Million/uL      Hemoglobin 12.4 g/dL      Hematocrit 36.8 %      MCV 92 fL      MCH 31.1 pg      MCHC 33.7 g/dL      RDW 11.7 %      MPV 9.3 fL      Platelets 234 Thousands/uL      nRBC 0 /100 WBCs      Segmented % 51 %      Immature Grans % 0 %      Lymphocytes % 41 %      Monocytes % 6 %      Eosinophils Relative 2 %      Basophils Relative 0 %      Absolute Neutrophils 2.92 Thousands/µL      Absolute Immature Grans 0.00 Thousand/uL      Absolute Lymphocytes 2.36 Thousands/µL      Absolute Monocytes 0.33 Thousand/µL      Eosinophils Absolute 0.09 Thousand/µL      Basophils Absolute 0.02 Thousands/µL     Fingerstick Glucose (POCT) [[id number]]  (Abnormal) Collected: 07/04/24 2128    Lab Status: Final result Specimen: Blood Updated: 07/04/24 2129     POC Glucose 195 mg/dl     Fingerstick Glucose (POCT) [[id number]]  (Abnormal) Collected: 07/04/24 1834    Lab Status: Final result Specimen: Blood Updated: 07/04/24 1957     POC Glucose 159 mg/dl     HS Troponin I 4hr [[id number]]  (Normal) Collected: 07/04/24 1746    Lab Status: Final result Specimen: Blood from Line, Venous Updated: 07/04/24 1823     hs TnI 4hr 6 ng/L      Delta 4hr hsTnI 1 ng/L     HS Troponin I 2hr [[id number]]  (Normal) Collected: 07/04/24 1534    Lab Status: Final result Specimen: Blood from Arm, Right  Updated: 07/04/24 1605     hs TnI 2hr 5 ng/L      Delta 2hr hsTnI 0 ng/L     HS Troponin 0hr (reflex protocol) [[id number]]  (Normal) Collected: 07/04/24 1327    Lab Status: Final result Specimen: Blood from Line, Venous Updated: 07/04/24 1417     hs TnI 0hr 5 ng/L     Comprehensive metabolic panel [[id number]]  (Abnormal) Collected: 07/04/24 1327    Lab Status: Final result Specimen: Blood from Line, Venous Updated: 07/04/24 1358     Sodium 138 mmol/L      Potassium 3.7 mmol/L      Chloride 104 mmol/L      CO2 28 mmol/L      ANION GAP 6 mmol/L      BUN 18 mg/dL      Creatinine 0.98 mg/dL      Glucose 146 mg/dL      Calcium 9.2 mg/dL      AST 16 U/L      ALT 17 U/L      Alkaline Phosphatase 88 U/L      Total Protein 7.3 g/dL      Albumin 4.3 g/dL      Total Bilirubin 0.62 mg/dL      eGFR 61 ml/min/1.73sq m     Narrative:      National Kidney Disease Foundation guidelines for Chronic Kidney Disease (CKD):     Stage 1 with normal or high GFR (GFR > 90 mL/min/1.73 square meters)    Stage 2 Mild CKD (GFR = 60-89 mL/min/1.73 square meters)    Stage 3A Moderate CKD (GFR = 45-59 mL/min/1.73 square meters)    Stage 3B Moderate CKD (GFR = 30-44 mL/min/1.73 square meters)    Stage 4 Severe CKD (GFR = 15-29 mL/min/1.73 square meters)    Stage 5 End Stage CKD (GFR <15 mL/min/1.73 square meters)  Note: GFR calculation is accurate only with a steady state creatinine    CBC and differential [[id number]]  (Abnormal) Collected: 07/04/24 1327    Lab Status: Final result Specimen: Blood from Line, Venous Updated: 07/04/24 1336     WBC 5.66 Thousand/uL      RBC 4.39 Million/uL      Hemoglobin 13.1 g/dL      Hematocrit 40.2 %      MCV 92 fL      MCH 29.8 pg      MCHC 32.6 g/dL      RDW 11.5 %      MPV 9.2 fL      Platelets 265 Thousands/uL      nRBC 0 /100 WBCs      Segmented % 49 %      Immature Grans % 0 %      Lymphocytes % 43 %      Monocytes % 7 %      Eosinophils Relative 1 %      Basophils Relative 0 %      Absolute Neutrophils 2.74  Thousands/µL      Absolute Immature Grans 0.01 Thousand/uL      Absolute Lymphocytes 2.43 Thousands/µL      Absolute Monocytes 0.38 Thousand/µL      Eosinophils Absolute 0.08 Thousand/µL      Basophils Absolute 0.02 Thousands/µL                    CT chest abdomen pelvis w contrast   Final Result by Blanco Allison MD (07/04 1506)      No acute/traumatic injury identified in the chest, abdomen or pelvis.      8 mm posterior left upper lobe nodule.   Based on current Fleischner Society 2017 Guidelines on incidental pulmonary nodule, follow-up non-contrast CT is recommended at 6-12 months from the initial examination and, if stable at that time, an additional follow-up is recommended for 18-24 months    from the initial examination.      Bilateral thyroid nodules, including a 1.7 cm left thyroid lobe nodule.   Thyroid ultrasound recommended for further evaluation.      Septated hypodense right lower pole lesion with indeterminate internal density.   Renal protocol CT versus MRI with contrast recommended for further evaluation.      Endometrium measures up to 8 mm, which is considered thickened in a postmenopausal patient.   Pelvic ultrasound recommended for further evaluation.      Hepatic steatosis.         The study was marked in EPIC for immediate notification.         Workstation performed: [id number]         CT head wo contrast   Final Result by Leatha Aguayo MD (07/04 1425)      No acute intracranial pathology. In particular, no intracranial hemorrhage or calvarial fracture.                     Workstation performed: [id number]         CT spine cervical without contrast   Final Result by Leatha Aguayo MD (07/04 1440)      No cervical spine fracture or traumatic malalignment.                     Workstation performed: [id number]         XR chest 1 view portable   Final Result by Neelam Worrell MD (07/04 1435)      No acute cardiopulmonary disease.            Workstation performed: [id number]     "     XR shoulder 2+ vw left    (Results Pending)         Procedures  Procedures      ED Course  ED Course as of 07/05/24 0658   Thu Jul 04, 2024   1322 This EKG interpreted by me. Rate 75, rhythm normal sinus, left axis, intervals wide, new left bundle branch block not present on previous EKG from 9/19/2022     1604 This EKG interpreted by me. Rate 55, rhythm sinus bradycardia, normal axis, intervals normal, biphasic T waves present in V2 not present on previous EKG from 9/19/2022.  Overall impression is grossly different than previous EKG obtained in triage.  Patient has not had any new chest pain while here, I suspect possible lead placement error on initial EKG.           CRAFFT      Flowsheet Row Most Recent Value   CRAFFT Initial Screen: During the past 12 months, did you:    1. Drink any alcohol (more than a few sips)?  No Filed at: 07/04/2024 2305   2. Smoke any marijuana or hashish No Filed at: 07/04/2024 2305   3. Use anything else to get high? (\"anything else\" includes illegal drugs, over the counter and prescription drugs, and things that you sniff or 'limon')? No Filed at: 07/04/2024 2305            HEART Risk Score      Flowsheet Row Most Recent Value   Heart Score Risk Calculator    History 1 Filed at: 07/04/2024 1522   ECG 1 Filed at: 07/04/2024 1522   Age 1 Filed at: 07/04/2024 1522   Risk Factors 2 Filed at: 07/04/2024 1522   Troponin 0 Filed at: 07/04/2024 1522   HEART Score 5 Filed at: 07/04/2024 1522                        SBIRT 22yo+      Flowsheet Row Most Recent Value   Initial Alcohol Screen: US AUDIT-C     1. How often do you have a drink containing alcohol? 0 Filed at: 07/04/2024 2305   2. How many drinks containing alcohol do you have on a typical day you are drinking?  0 Filed at: 07/04/2024 2305   3a. Male UNDER 65: How often do you have five or more drinks on one occasion? 0 Filed at: 07/04/2024 2305   3b. FEMALE Any Age, or MALE 65+: How often do you have 4 or more drinks on one " occassion? 0 Filed at: 07/04/2024 2305   Audit-C Score 0 Filed at: 07/04/2024 2305   SHANE: How many times in the past year have you...    Used an illegal drug or used a prescription medication for non-medical reasons? Never Filed at: 07/04/2024 2305          LILLIANA Risk Score      Flowsheet Row Most Recent Value   Age >= 65 0 Filed at: 07/04/2024 1752   Known CAD (stenosis >= 50%) 0 Filed at: 07/04/2024 1752   Recent (<=24 hrs) Service Angina 0 Filed at: 07/04/2024 1752   ST Deviation >= 0.5 mm 0 Filed at: 07/04/2024 1752   3+ CAD Risk Factors (FHx, HTN, HLP, DM, Smoker) 1 Filed at: 07/04/2024 1752   Aspirin Use Past 7 Days 1 Filed at: 07/04/2024 1752   Elevated Cardiac Markers 0 Filed at: 07/04/2024 1752   LILLIANA Risk Score (Calculated) 2 Filed at: 07/04/2024 1752                Medical Decision Making  Patient is a 63-year-old female with history of CAD, diabetes, hyperlipidemia and hypertension who presents for chest pain.    DDx includes but is limited to ACS, ICH, cervical spine fracture, rib fractures, shoulder fracture, pneumothorax.  Will plan to obtain cardiac workup, CT head, CT C-spine, and CT chest abdomen pelvis to rule out any traumatic injuries.  Pain control as needed.    Patient's initial EKG concerning for new left bundle branch block, however this was not present on follow-up EKG though did have new biphasic T wave in V2.  I doubt accuracy of patient's initial EKG in triage.  The remainder of her workup was overall unremarkable with normal troponin and negative imaging there were multiple incidental findings on CT chest abdomen pelvis.  I discussed with patient that given her EKG changes that she is at risk for an a cardiac event and that she should be admitted to the hospital.  Patient initially reluctant but patient's son did convince her to stay in the hospital.  I discussed the case with the hospitalist who agrees admit the patient for cardiac monitoring and ACS rule out.    Amount and/or  Complexity of Data Reviewed  Labs: ordered.  Radiology: ordered.    Risk  OTC drugs.  Prescription drug management.  Decision regarding hospitalization.          Disposition  Final diagnoses:   Chest pain   CAD (coronary artery disease)   HTN (hypertension)   HLD (hyperlipidemia)   DM (diabetes mellitus) (HCC)     Time reflects when diagnosis was documented in both MDM as applicable and the Disposition within this note       Time User Action Codes Description Comment    7/4/2024  4:32 PM Krivchenia, Kermit Add [R07.9] Chest pain     7/4/2024  4:32 PM Krivchenia, Kermit Add [I25.10] CAD (coronary artery disease)     7/4/2024  4:32 PM Krivchenia, Kermit Add [I10] HTN (hypertension)     7/4/2024  4:32 PM Krivchenia, Kermit Add [E78.5] HLD (hyperlipidemia)     7/4/2024  4:32 PM Krivchenia, Kermit Add [E11.9] DM (diabetes mellitus) (HCC)           ED Disposition       ED Disposition   Admit    Condition   Stable    Date/Time   Thu Jul 4, 2024 1632    Comment   Case was discussed with Dr. Celaya and the patient's admission status was agreed to be Admission Status: observation status to the service of Dr. Celaya .               Follow-up Information    None         Patient's Medications   Discharge Prescriptions    No medications on file     No discharge procedures on file.    PDMP Review       None             ED Provider  Attending physically available and evaluated Vani Tay. I managed the patient along with the ED Attending.    Electronically Signed by           Kermit Lee MD  07/05/24 0673

## 2024-07-04 NOTE — ASSESSMENT & PLAN NOTE
Resume home medication metoprolol and isosorbide.  Will hold lisinopril due to very well-controlled BP

## 2024-07-04 NOTE — ASSESSMENT & PLAN NOTE
Follows up with . [facility] is Dr. Marin.  Continue aspirin, statin, metoprolol, Plavix

## 2024-07-04 NOTE — ASSESSMENT & PLAN NOTE
Bilateral thyroid nodules, including a 1.7 cm left thyroid lobe nodule. Thyroid ultrasound recommended for further evaluation.   Septated hypodense right lower pole lesion with indeterminate internal density. Renal protocol CT versus MRI with contrast recommended for further evaluation.   Endometrium measures up to 8 mm, which is considered thickened in a postmenopausal patient. Pelvic ultrasound recommended for further evaluation.    Will need outpatient f/u with PCP

## 2024-07-04 NOTE — ASSESSMENT & PLAN NOTE
63-year-old female with PMH of CAD s/p drug-eluting stent in multiple vessels, presented to ED with complaint of left-sided chest pain, left shoulder pain, and left arm pain.  Initial EKG does not reveal any ST wave elevation or depression.  Troponin x 2 negative.  On exam, patient has left-sided chest wall tenderness, left shoulder tenderness, and worsening pain with left shoulder movement.  CT chest abdomen pelvis is negative for acute finding.  Placed on telemetry monitor, obtain echo.  Most likely musculoskeletal pain.  Obtain left shoulder x-ray.  Lidocaine patch, Tylenol

## 2024-07-05 ENCOUNTER — APPOINTMENT (OUTPATIENT)
Dept: NON INVASIVE DIAGNOSTICS | Facility: HOSPITAL | Age: 64
End: 2024-07-05
Payer: COMMERCIAL

## 2024-07-05 VITALS
HEIGHT: 62 IN | BODY MASS INDEX: 24.54 KG/M2 | DIASTOLIC BLOOD PRESSURE: 61 MMHG | TEMPERATURE: 98.3 F | OXYGEN SATURATION: 95 % | SYSTOLIC BLOOD PRESSURE: 103 MMHG | HEART RATE: 79 BPM | WEIGHT: 133.38 LBS | RESPIRATION RATE: 16 BRPM

## 2024-07-05 LAB
ANION GAP SERPL CALCULATED.3IONS-SCNC: 5 MMOL/L (ref 4–13)
AORTIC ROOT: 3.1 CM
APICAL FOUR CHAMBER EJECTION FRACTION: 51 %
ASCENDING AORTA: 2.8 CM
ATRIAL RATE: 68 BPM
BASOPHILS # BLD AUTO: 0.02 THOUSANDS/ÂΜL (ref 0–0.1)
BASOPHILS NFR BLD AUTO: 0 % (ref 0–1)
BSA FOR ECHO PROCEDURE: 1.61 M2
BUN SERPL-MCNC: 23 MG/DL (ref 5–25)
CALCIUM SERPL-MCNC: 9.3 MG/DL (ref 8.4–10.2)
CHLORIDE SERPL-SCNC: 106 MMOL/L (ref 96–108)
CO2 SERPL-SCNC: 29 MMOL/L (ref 21–32)
CREAT SERPL-MCNC: 1.03 MG/DL (ref 0.6–1.3)
E WAVE DECELERATION TIME: 215 MS
E/A RATIO: 0.94
EOSINOPHIL # BLD AUTO: 0.09 THOUSAND/ÂΜL (ref 0–0.61)
EOSINOPHIL NFR BLD AUTO: 2 % (ref 0–6)
ERYTHROCYTE [DISTWIDTH] IN BLOOD BY AUTOMATED COUNT: 11.7 % (ref 11.6–15.1)
EST. AVERAGE GLUCOSE BLD GHB EST-MCNC: 206 MG/DL
FRACTIONAL SHORTENING: 25 (ref 28–44)
GFR SERPL CREATININE-BSD FRML MDRD: 57 ML/MIN/1.73SQ M
GLUCOSE SERPL-MCNC: 214 MG/DL (ref 65–140)
GLUCOSE SERPL-MCNC: 231 MG/DL (ref 65–140)
GLUCOSE SERPL-MCNC: 86 MG/DL (ref 65–140)
GLUCOSE SERPL-MCNC: 87 MG/DL (ref 65–140)
HBA1C MFR BLD: 8.8 %
HCT VFR BLD AUTO: 36.8 % (ref 34.8–46.1)
HGB BLD-MCNC: 12.4 G/DL (ref 11.5–15.4)
IMM GRANULOCYTES # BLD AUTO: 0 THOUSAND/UL (ref 0–0.2)
IMM GRANULOCYTES NFR BLD AUTO: 0 % (ref 0–2)
INTERVENTRICULAR SEPTUM IN DIASTOLE (PARASTERNAL SHORT AXIS VIEW): 1.1 CM
INTERVENTRICULAR SEPTUM: 1.1 CM (ref 0.6–1.1)
LAAS-AP2: 13.3 CM2
LAAS-AP4: 13 CM2
LEFT ATRIUM SIZE: 2.9 CM
LEFT ATRIUM VOLUME (MOD BIPLANE): 30 ML
LEFT ATRIUM VOLUME INDEX (MOD BIPLANE): 18.6 ML/M2
LEFT INTERNAL DIMENSION IN SYSTOLE: 2.7 CM (ref 2.1–4)
LEFT VENTRICLE DIASTOLIC VOLUME (MOD BIPLANE): 70 ML
LEFT VENTRICLE DIASTOLIC VOLUME INDEX (MOD BIPLANE): 43.5 ML/M2
LEFT VENTRICLE SYSTOLIC VOLUME (MOD BIPLANE): 37 ML
LEFT VENTRICLE SYSTOLIC VOLUME INDEX (MOD BIPLANE): 23 ML/M2
LEFT VENTRICULAR INTERNAL DIMENSION IN DIASTOLE: 3.6 CM (ref 3.5–6)
LEFT VENTRICULAR POSTERIOR WALL IN END DIASTOLE: 1.1 CM
LEFT VENTRICULAR STROKE VOLUME: 26 ML
LV EF: 47 %
LVSV (TEICH): 26 ML
LYMPHOCYTES # BLD AUTO: 2.36 THOUSANDS/ÂΜL (ref 0.6–4.47)
LYMPHOCYTES NFR BLD AUTO: 41 % (ref 14–44)
MCH RBC QN AUTO: 31.1 PG (ref 26.8–34.3)
MCHC RBC AUTO-ENTMCNC: 33.7 G/DL (ref 31.4–37.4)
MCV RBC AUTO: 92 FL (ref 82–98)
MONOCYTES # BLD AUTO: 0.33 THOUSAND/ÂΜL (ref 0.17–1.22)
MONOCYTES NFR BLD AUTO: 6 % (ref 4–12)
MV E'TISSUE VEL-SEP: 7 CM/S
MV PEAK A VEL: 0.81 M/S
MV PEAK E VEL: 76 CM/S
MV STENOSIS PRESSURE HALF TIME: 62 MS
MV VALVE AREA P 1/2 METHOD: 3.55
NEUTROPHILS # BLD AUTO: 2.92 THOUSANDS/ÂΜL (ref 1.85–7.62)
NEUTS SEG NFR BLD AUTO: 51 % (ref 43–75)
NRBC BLD AUTO-RTO: 0 /100 WBCS
P AXIS: 63 DEGREES
PLATELET # BLD AUTO: 234 THOUSANDS/UL (ref 149–390)
PMV BLD AUTO: 9.3 FL (ref 8.9–12.7)
POTASSIUM SERPL-SCNC: 4 MMOL/L (ref 3.5–5.3)
PR INTERVAL: 152 MS
QRS AXIS: 235 DEGREES
QRSD INTERVAL: 114 MS
QT INTERVAL: 420 MS
QTC INTERVAL: 446 MS
RA PRESSURE ESTIMATED: 5 MMHG
RBC # BLD AUTO: 3.99 MILLION/UL (ref 3.81–5.12)
RIGHT ATRIAL 2D VOLUME: 13 ML
RIGHT ATRIUM AREA SYSTOLE A4C: 8.7 CM2
RIGHT VENTRICLE ID DIMENSION: 2.8 CM
SL CV LEFT ATRIUM LENGTH A2C: 4.6 CM
SL CV LV EF: 55
SL CV PED ECHO LEFT VENTRICLE DIASTOLIC VOLUME (MOD BIPLANE) 2D: 53 ML
SL CV PED ECHO LEFT VENTRICLE SYSTOLIC VOLUME (MOD BIPLANE) 2D: 26 ML
SODIUM SERPL-SCNC: 140 MMOL/L (ref 135–147)
T WAVE AXIS: 48 DEGREES
TRICUSPID ANNULAR PLANE SYSTOLIC EXCURSION: 1.8 CM
VENTRICULAR RATE: 68 BPM
WBC # BLD AUTO: 5.72 THOUSAND/UL (ref 4.31–10.16)

## 2024-07-05 PROCEDURE — 85025 COMPLETE CBC W/AUTO DIFF WBC: CPT | Performed by: FAMILY MEDICINE

## 2024-07-05 PROCEDURE — 83036 HEMOGLOBIN GLYCOSYLATED A1C: CPT | Performed by: FAMILY MEDICINE

## 2024-07-05 PROCEDURE — 99239 HOSP IP/OBS DSCHRG MGMT >30: CPT

## 2024-07-05 PROCEDURE — 93306 TTE W/DOPPLER COMPLETE: CPT | Performed by: INTERNAL MEDICINE

## 2024-07-05 PROCEDURE — 93010 ELECTROCARDIOGRAM REPORT: CPT | Performed by: INTERNAL MEDICINE

## 2024-07-05 PROCEDURE — 80048 BASIC METABOLIC PNL TOTAL CA: CPT | Performed by: FAMILY MEDICINE

## 2024-07-05 PROCEDURE — 82948 REAGENT STRIP/BLOOD GLUCOSE: CPT

## 2024-07-05 PROCEDURE — 93306 TTE W/DOPPLER COMPLETE: CPT

## 2024-07-05 PROCEDURE — 36415 COLL VENOUS BLD VENIPUNCTURE: CPT | Performed by: FAMILY MEDICINE

## 2024-07-05 RX ORDER — LIDOCAINE 50 MG/G
1 PATCH TOPICAL DAILY
Status: DISCONTINUED | OUTPATIENT
Start: 2024-07-05 | End: 2024-07-05 | Stop reason: HOSPADM

## 2024-07-05 RX ORDER — EZETIMIBE 10 MG/1
10 TABLET ORAL
Status: DISCONTINUED | OUTPATIENT
Start: 2024-07-05 | End: 2024-07-05 | Stop reason: HOSPADM

## 2024-07-05 RX ORDER — FENOFIBRATE 145 MG/1
145 TABLET, COATED ORAL DAILY
Status: DISCONTINUED | OUTPATIENT
Start: 2024-07-05 | End: 2024-07-05 | Stop reason: HOSPADM

## 2024-07-05 RX ORDER — METHOCARBAMOL 500 MG/1
500 TABLET, FILM COATED ORAL 4 TIMES DAILY PRN
Qty: 14 TABLET | Refills: 0 | Status: SHIPPED | OUTPATIENT
Start: 2024-07-05

## 2024-07-05 RX ADMIN — ISOSORBIDE MONONITRATE 30 MG: 30 TABLET, EXTENDED RELEASE ORAL at 08:00

## 2024-07-05 RX ADMIN — FENOFIBRATE 145 MG: 145 TABLET, FILM COATED ORAL at 08:00

## 2024-07-05 RX ADMIN — ASPIRIN 81 MG: 81 TABLET, COATED ORAL at 08:00

## 2024-07-05 RX ADMIN — METOPROLOL SUCCINATE 25 MG: 25 TABLET, EXTENDED RELEASE ORAL at 08:00

## 2024-07-05 RX ADMIN — LEVOTHYROXINE SODIUM 25 MCG: 25 TABLET ORAL at 05:33

## 2024-07-05 RX ADMIN — CLOPIDOGREL BISULFATE 75 MG: 75 TABLET ORAL at 08:00

## 2024-07-05 RX ADMIN — INSULIN LISPRO 4 UNITS: 100 INJECTION, SOLUTION INTRAVENOUS; SUBCUTANEOUS at 16:34

## 2024-07-05 RX ADMIN — INSULIN LISPRO 5 UNITS: 100 INJECTION, SOLUTION INTRAVENOUS; SUBCUTANEOUS at 11:04

## 2024-07-05 RX ADMIN — HEPARIN SODIUM 5000 UNITS: 5000 INJECTION INTRAVENOUS; SUBCUTANEOUS at 13:32

## 2024-07-05 RX ADMIN — INSULIN LISPRO 5 UNITS: 100 INJECTION, SOLUTION INTRAVENOUS; SUBCUTANEOUS at 16:34

## 2024-07-05 RX ADMIN — LIDOCAINE 5% 1 PATCH: 700 PATCH TOPICAL at 11:04

## 2024-07-05 RX ADMIN — INSULIN LISPRO 4 UNITS: 100 INJECTION, SOLUTION INTRAVENOUS; SUBCUTANEOUS at 11:03

## 2024-07-05 RX ADMIN — HEPARIN SODIUM 5000 UNITS: 5000 INJECTION INTRAVENOUS; SUBCUTANEOUS at 05:33

## 2024-07-05 RX ADMIN — PANTOPRAZOLE SODIUM 40 MG: 40 TABLET, DELAYED RELEASE ORAL at 05:33

## 2024-07-05 RX ADMIN — ATORVASTATIN CALCIUM 80 MG: 80 TABLET, FILM COATED ORAL at 08:00

## 2024-07-05 RX ADMIN — EZETIMIBE 10 MG: 10 TABLET ORAL at 01:15

## 2024-07-05 NOTE — ASSESSMENT & PLAN NOTE
Lab Results   Component Value Date    HGBA1C 8.8 (H) 07/05/2024       Recent Labs     07/04/24  1834 07/04/24  2128 07/05/24  0745   POCGLU 159* 195* 87       Blood Sugar Average: Last 72 hrs:  (P) 147    Home regimen Lantus 35 units at bedtime, and aspart 10 units 3 times daily with meals.  Continue home regimen on discharge

## 2024-07-05 NOTE — INCIDENTAL FINDINGS
The following findings require follow up:  Radiographic finding   Findin mm posterior left upper lobe nodule., Based on current Fleischner Society 2017 Guidelines on incidental pulmonary nodule, follow-up non-contrast CT is recommended at 6-12 months from the initial examination and, if stable at that time, an additional follow-up is recommended for 18-24 months , from the initial examination., Bilateral thyroid nodules, including a 1.7 cm left thyroid lobe nodule., Thyroid ultrasound recommended for further evaluation., Septated hypodense right lower pole lesion with indeterminate internal density., Renal protocol CT versus MRI with contrast recommended for further evaluation., Endometrium measures up to 8 mm, which is considered thickened in a postmenopausal patient., Pelvic ultrasound recommended for further evaluation., Hepatic steatosis.    Follow up required: yes   Follow up should be done within: 6 month(s)    Please notify the following clinician to assist with the follow up:   ARON Elkins         Incidental finding results were discussed with the Patient by Hope Ann PA-C on 24.   They expressed understanding and all questions answered.   PAST SURGICAL HISTORY:  No significant past surgical history

## 2024-07-05 NOTE — ASSESSMENT & PLAN NOTE
Bilateral thyroid nodules, including a 1.7 cm left thyroid lobe nodule. Thyroid ultrasound recommended for further evaluation.  Septated hypodense right lower pole lesion with indeterminate internal density. Renal protocol CT versus MRI with contrast recommended for further evaluation.  Endometrium measures up to 8 mm, which is considered thickened in a postmenopausal patient. Pelvic ultrasound recommended for further evaluation.    Will need outpatient f/u with PCP  Patient and son notified about abnormal findings.

## 2024-07-05 NOTE — UTILIZATION REVIEW
Initial Clinical Review    Admission: Date/Time/Statement:   Admission Orders (From admission, onward)       Ordered        07/04/24 1633  Place in Observation  Once                          Orders Placed This Encounter   Procedures    Place in Observation     Standing Status:   Standing     Number of Occurrences:   1     Order Specific Question:   Level of Care     Answer:   Med Surg [16]     ED Arrival Information       Expected   -    Arrival   7/4/2024 13:04    Acuity   Urgent              Means of arrival   Walk-In    Escorted by   Family Member    Service   Hospitalist    Admission type   Emergency              Arrival complaint   left arm pain, CP             Chief Complaint   Patient presents with    Chest Pain     Pt fell onto L side chest 3 days ago, c/o L arm pain and CP since then, on plavix, - headstrike, hx MI       Initial Presentation: 63 y.o. female who presented self from home to [facility] ED. Admitted as Observation for evaluation and treatment of Chest Pain     PMHx:  has a past medical history of Coronary artery disease involving native coronary artery of native heart without angina pectoris, Diabetes mellitus (HCC), Dyslipidemia, Essential hypertension, GERD (gastroesophageal reflux disease), Hyperlipidemia, Hypertension, MI (myocardial infarction) (HCC), Myocardial infarction (HCC), SOB (shortness of breath), Type 2 diabetes mellitus (HCC), and Wears glasses.      Presented w/ L sided CP, L shoulder and L arm pain. On exam, patient has left-sided chest wall tenderness, left shoulder tenderness, and worsening pain with left shoulder movement.     Plan: Pain control, Tele monitoring. ECHO.    ED Triage Vitals   Temperature Pulse Respirations Blood Pressure SpO2 Pain Score   07/04/24 1308 07/04/24 1308 07/04/24 1308 07/04/24 1308 07/04/24 1308 07/04/24 1354   (!) 97.3 °F (36.3 °C) 74 18 165/88 98 % 9     Weight (last 2 days)       Date/Time Weight    07/05/24 1400  60.5 (133.38)    07/05/24 0745 60.5 (133.38)            Vital Signs (last 3 days)       Date/Time Temp Pulse Resp BP MAP (mmHg) SpO2 O2 Device Patient Position - Orthostatic VS Pain    07/05/24 15:34:29 98.3 °F (36.8 °C) 79 16 103/61 75 95 % -- -- --    07/05/24 1400 -- 63 -- 100/53 -- 95 % -- -- --    07/05/24 10:40:58 98 °F (36.7 °C) 63 16 100/53 69 97 % -- -- --    07/05/24 0800 -- -- -- -- -- 97 % None (Room air) -- No Pain    07/05/24 07:48:50 97.9 °F (36.6 °C) 56 16 129/67 88 97 % None (Room air) Lying No Pain    07/05/24 0700 -- 59 16 138/63 91 98 % -- -- --    07/05/24 0600 -- 53 22 113/52 75 96 % None (Room air) -- --    07/05/24 0500 -- 57 21 108/57 78 95 % None (Room air) -- --    07/05/24 0400 -- 50 21 124/56 80 95 % None (Room air) -- --    07/05/24 0300 -- 54 22 126/58 84 97 % None (Room air) -- --    07/05/24 0200 -- 54 23 123/66 88 97 % None (Room air) -- --    07/05/24 0100 -- 59 20 134/73 95 99 % None (Room air) -- --    07/05/24 0000 -- 56 22 114/57 79 97 % None (Room air) -- --    07/04/24 2300 -- 57 18 124/62 85 97 % None (Room air) -- --    07/04/24 2200 -- 58 20 135/61 87 97 % None (Room air) -- --    07/04/24 2100 -- 60 22 124/68 89 97 % None (Room air) -- --    07/04/24 2000 -- 73 18 114/56 77 97 % None (Room air) -- --    07/04/24 1700 -- 56 20 133/65 93 99 % None (Room air) Lying --    07/04/24 1500 -- 57 21 105/56 75 97 % None (Room air) -- --    07/04/24 1400 -- 65 19 115/65 85 98 % -- -- --    07/04/24 1354 -- -- -- -- -- -- -- -- 9    07/04/24 1308 97.3 °F (36.3 °C) 74 18 165/88 119 98 % None (Room air) Sitting --              Pertinent Labs/Diagnostic Test Results:   Radiology:  XR shoulder 2+ vw left   Final Interpretation by Ruddy Morin MD (07/05 0908)      No acute osseous abnormality.         Computerized Assisted Algorithm (CAA) may have been used to analyze all applicable images.         Workstation performed: [id number]         CT chest abdomen pelvis w contrast   Final  Interpretation by Blanco Allison MD (07/04 3506)      No acute/traumatic injury identified in the chest, abdomen or pelvis.      8 mm posterior left upper lobe nodule.   Based on current Fleischner Society 2017 Guidelines on incidental pulmonary nodule, follow-up non-contrast CT is recommended at 6-12 months from the initial examination and, if stable at that time, an additional follow-up is recommended for 18-24 months    from the initial examination.      Bilateral thyroid nodules, including a 1.7 cm left thyroid lobe nodule.   Thyroid ultrasound recommended for further evaluation.      Septated hypodense right lower pole lesion with indeterminate internal density.   Renal protocol CT versus MRI with contrast recommended for further evaluation.      Endometrium measures up to 8 mm, which is considered thickened in a postmenopausal patient.   Pelvic ultrasound recommended for further evaluation.      Hepatic steatosis.         The study was marked in EPIC for immediate notification.         Workstation performed: [id number]         CT head wo contrast   Final Interpretation by Leatha Aguayo MD (07/04 1425)      No acute intracranial pathology. In particular, no intracranial hemorrhage or calvarial fracture.                     Workstation performed: [id number]         CT spine cervical without contrast   Final Interpretation by Leatha Aguayo MD (07/04 1440)      No cervical spine fracture or traumatic malalignment.                     Workstation performed: [id number]         XR chest 1 view portable   Final Interpretation by Neelam Worrell MD (07/04 1435)      No acute cardiopulmonary disease.            Workstation performed: [id number]           Cardiology:  Echo complete w/ contrast if indicated   Final Result by Tami Jacobo MD (07/05 9511)        Left Ventricle: Left ventricular cavity size is normal. Wall thickness    is normal. The left ventricular ejection fraction is 55%. Systolic   "  function is normal. Diastolic function is normal for age.     The following segments are hypokinetic: basal inferior.     All other segments are normal.     Right Ventricle: Right ventricular cavity size is normal. Systolic    function is normal.           GI:  No orders to display           Results from last 7 days   Lab Units 07/05/24  0540 07/04/24  1327   WBC Thousand/uL 5.72 5.66   HEMOGLOBIN g/dL 12.4 13.1   HEMATOCRIT % 36.8 40.2   PLATELETS Thousands/uL 234 265   TOTAL NEUT ABS Thousands/µL 2.92 2.74         Results from last 7 days   Lab Units 07/05/24  0540 07/04/24  1327   SODIUM mmol/L 140 138   POTASSIUM mmol/L 4.0 3.7   CHLORIDE mmol/L 106 104   CO2 mmol/L 29 28   ANION GAP mmol/L 5 6   BUN mg/dL 23 18   CREATININE mg/dL 1.03 0.98   EGFR ml/min/1.73sq m 57 61   CALCIUM mg/dL 9.3 9.2     Results from last 7 days   Lab Units 07/04/24  1327   AST U/L 16   ALT U/L 17   ALK PHOS U/L 88   TOTAL PROTEIN g/dL 7.3   ALBUMIN g/dL 4.3   TOTAL BILIRUBIN mg/dL 0.62     Results from last 7 days   Lab Units 07/05/24  1535 07/05/24  1039 07/05/24  0745 07/04/24  2128 07/04/24  1834   POC GLUCOSE mg/dl 231* 214* 87 195* 159*     Results from last 7 days   Lab Units 07/05/24  0540 07/04/24  1327   GLUCOSE RANDOM mg/dL 86 146*         Results from last 7 days   Lab Units 07/05/24  0540   HEMOGLOBIN A1C % 8.8*   EAG mg/dl 206     No results found for: \"BETA-HYDROXYBUTYRATE\"                   Results from last 7 days   Lab Units 07/04/24  1746 07/04/24  1534 07/04/24  1327   HS TNI 0HR ng/L  --   --  5   HS TNI 2HR ng/L  --  5  --    HSTNI D2 ng/L  --  0  --    HS TNI 4HR ng/L 6  --   --    HSTNI D4 ng/L 1  --   --                        ED Treatment-Medication Administration from 07/04/2024 1304 to 07/05/2024 0737         Date/Time Order Dose Route Action     07/04/2024 1354 ketorolac (TORADOL) injection 15 mg 15 mg Intravenous Given     07/04/2024 1354 acetaminophen (TYLENOL) tablet 975 mg 975 mg Oral Given     " 07/04/2024 1355 lidocaine (LIDODERM) 5 % patch 1 patch 1 patch Topical Medication Applied     07/05/2024 0116 lidocaine (LIDODERM) 5 % patch 1 patch 1 patch Topical Patch Removed     07/04/2024 1419 iohexol (OMNIPAQUE) 350 MG/ML injection (MULTI-DOSE) 100 mL 100 mL Intravenous Given     07/04/2024 1837 insulin lispro (HumALOG/ADMELOG) 100 units/mL subcutaneous injection 5 Units 5 Units Subcutaneous Given     07/04/2024 2204 insulin glargine (LANTUS) subcutaneous injection 25 Units 0.25 mL 25 Units Subcutaneous Given     07/05/2024 0533 levothyroxine tablet 25 mcg 25 mcg Oral Given     07/05/2024 0533 pantoprazole (PROTONIX) EC tablet 40 mg 40 mg Oral Given     07/04/2024 2204 heparin (porcine) subcutaneous injection 5,000 Units 5,000 Units Subcutaneous Given     07/05/2024 0533 heparin (porcine) subcutaneous injection 5,000 Units 5,000 Units Subcutaneous Given     07/04/2024 1837 insulin lispro (HumALOG/ADMELOG) 100 units/mL subcutaneous injection 2-12 Units 2 Units Subcutaneous Given     07/04/2024 2204 insulin lispro (HumALOG/ADMELOG) 100 units/mL subcutaneous injection 1-6 Units 2 Units Subcutaneous Given     07/05/2024 0115 ezetimibe (ZETIA) tablet 10 mg 10 mg Oral Given            Past Medical History:   Diagnosis Date    Coronary artery disease involving native coronary artery of native heart without angina pectoris     Diabetes mellitus (HCC)     Dyslipidemia     Essential hypertension     GERD (gastroesophageal reflux disease)     Hyperlipidemia     Hypertension     MI (myocardial infarction) (HCC)     Myocardial infarction (HCC)     SOB (shortness of breath)     cardiac cath today 10/18/2021    Type 2 diabetes mellitus (HCC)     Wears glasses      Present on Admission:   CAD (coronary artery disease)      Admitting Diagnosis: Chest pain [R07.9]  HTN (hypertension) [I10]  CAD (coronary artery disease) [I25.10]  DM (diabetes mellitus) (HCC) [E11.9]  HLD (hyperlipidemia) [E78.5]  Age/Sex: 63 y.o.  female  Admission Orders:  Scheduled Medications:  aspirin, 81 mg, Oral, Daily  atorvastatin, 80 mg, Oral, Daily  clopidogrel, 75 mg, Oral, Daily  ezetimibe, 10 mg, Oral, HS  fenofibrate, 145 mg, Oral, Daily  gabapentin, 300 mg, Oral, TID  heparin (porcine), 5,000 Units, Subcutaneous, Q8H VALENCIA  insulin glargine, 25 Units, Subcutaneous, HS  insulin lispro, 1-6 Units, Subcutaneous, HS  insulin lispro, 2-12 Units, Subcutaneous, TID AC  insulin lispro, 5 Units, Subcutaneous, TID With Meals  isosorbide mononitrate, 30 mg, Oral, Daily  levothyroxine, 25 mcg, Oral, Early Morning  lidocaine, 1 patch, Topical, Daily  metoprolol succinate, 25 mg, Oral, Daily  pantoprazole, 40 mg, Oral, Early Morning      Continuous IV Infusions:     PRN Meds:  acetaminophen, 650 mg, Oral, Q6H PRN  aluminum-magnesium hydroxide-simethicone, 30 mL, Oral, Q6H PRN  ondansetron, 4 mg, Intravenous, Q6H PRN        None    Network Utilization Review Department  ATTENTION: Please call with any questions or concerns to [phone] and carefully listen to the prompts so that you are directed to the right person. All voicemails are confidential.   For Discharge needs, contact Care Management DC Support Team at [phone] opt. 2  Send all requests for admission clinical reviews, approved or denied determinations and any other requests to dedicated fax number below belonging to the campus where the patient is receiving treatment. List of dedicated fax numbers for the Facilities:  FACILITY NAME UR FAX NUMBER   ADMISSION DENIALS (Administrative/Medical Necessity) [phone]   DISCHARGE SUPPORT TEAM (NETWORK) [phone]   PARENT CHILD HEALTH (Maternity/NICU/Pediatrics) [phone]   [facility] [phone]   [facility] [phone]   [facility] [phone]   [facility] [phone]   [facility] [phone]   [facility]  [facility] [phone]   [facility] [phone]   [facility] [phone]   [facility] [phone]   [facility] [phone]   [facility] [phone]   [facility] [phone]

## 2024-07-05 NOTE — DISCHARGE SUMMARY
[facility]  Discharge- Vani Tay 1960, 63 y.o. female MRN: [id number]  Unit/Bed#: [facility] 506-01 Encounter: [id number]  Primary Care Provider: ARON Diaz   Date and time admitted to hospital: 7/4/2024  1:12 PM    * Other chest pain  Assessment & Plan  63-year-old female with PMH of CAD s/p drug-eluting stent in multiple vessels, presented to ED with complaint of left-sided chest pain, left shoulder pain, and left arm pain  Admits to mechanical fall 3 days prior striking her L side however states she experienced a similar type of pain prior to fall  Initial EKG does not reveal any ST wave elevation or depression.  Troponin x 2 negative.  On exam, patient has left-sided chest wall tenderness, left shoulder tenderness, and worsening pain with left shoulder movement.  CT chest abdomen pelvis is negative for acute finding.  L shoulder xray: No acute osseous abnormality.   Placed on telemetry monitor  Repeat echo: EF 55%, systolic function normal, diastolic function normal for age.  Most likely musculoskeletal pain.  Lidocaine patch, Tylenol  Ambulatory referral to PT    Imaging abnormalities  Assessment & Plan  Bilateral thyroid nodules, including a 1.7 cm left thyroid lobe nodule. Thyroid ultrasound recommended for further evaluation.  Septated hypodense right lower pole lesion with indeterminate internal density. Renal protocol CT versus MRI with contrast recommended for further evaluation.  Endometrium measures up to 8 mm, which is considered thickened in a postmenopausal patient. Pelvic ultrasound recommended for further evaluation.    Will need outpatient f/u with PCP  Patient and son notified about abnormal findings.    Lung nodule seen on imaging study  Assessment & Plan  Incidental finding on CT scan. Need OP F/u in 6 months    Acquired hypothyroidism  Assessment & Plan  Resume home levothyroxine    Essential hypertension  Assessment & Plan  Resume  home medication metoprolol and isosorbide.    Continue holding lisinopril on discharge.  Follow-up with PCP in 1 to 2 weeks for further instruction on this medication    Mixed hyperlipidemia  Assessment & Plan  Resume home medication atorvastatin, ezetimibe, and fenofibrate    Left shoulder pain  Assessment & Plan  L shoulder xray: No acute osseous abnormality.   Pain control  Lidocaine patches ordered     Type 2 diabetes mellitus, with long-term current use of insulin ([facility])  Assessment & Plan  Lab Results   Component Value Date    HGBA1C 8.8 (H) 07/05/2024       Recent Labs     07/04/24  1834 07/04/24  2128 07/05/24  0745   POCGLU 159* 195* 87       Blood Sugar Average: Last 72 hrs:  (P) 147    Home regimen Lantus 35 units at bedtime, and aspart 10 units 3 times daily with meals.  Continue home regimen on discharge    CAD (coronary artery disease)  Assessment & Plan  Follows up with [facility] cardiology is Dr. Marin.    Continue aspirin, statin, metoprolol, Plavix      Medical Problems       Resolved Problems  Date Reviewed: 7/5/2024   None       Discharging Physician / Practitioner: Hope Ann PA-C  PCP: ARON Diaz  Admission Date:   Admission Orders (From admission, onward)       Ordered        07/04/24 1633  Place in Observation  Once                          Discharge Date: 07/05/24    Consultations During Hospital Stay:  None    Procedures Performed:   None    Significant Findings / Test Results:   Echo complete w/ contrast if indicated 7/5/2024  Narrative:   Left Ventricle: Left ventricular cavity size is normal. Wall thickness is normal. The left ventricular ejection fraction is 55%. Systolic function is normal. Diastolic function is normal for age.   The following segments are hypokinetic: basal inferior.   All other segments are normal.   Right Ventricle: Right ventricular cavity size is normal. Systolic function is normal.     XR shoulder 2+ vw left 7/5/2024  Impression: No acute  osseous abnormality. Computerized Assisted Algorithm (CAA) may have been used to analyze all applicable images.     CT chest abdomen pelvis w contrast 7/4/2024  Impression: No acute/traumatic injury identified in the chest, abdomen or pelvis. 8 mm posterior left upper lobe nodule. Based on current Fleischner Society 2017 Guidelines on incidental pulmonary nodule, follow-up non-contrast CT is recommended at 6-12 months from the initial examination and, if stable at that time, an additional follow-up is recommended for 18-24 months from the initial examination. Bilateral thyroid nodules, including a 1.7 cm left thyroid lobe nodule. Thyroid ultrasound recommended for further evaluation. Septated hypodense right lower pole lesion with indeterminate internal density. Renal protocol CT versus MRI with contrast recommended for further evaluation. Endometrium measures up to 8 mm, which is considered thickened in a postmenopausal patient. Pelvic ultrasound recommended for further evaluation. Hepatic steatosis.     CT spine cervical without contrast 7/4/2024  Impression: No cervical spine fracture or traumatic malalignment.     XR chest 1 view portable 7/4/2024  Impression: No acute cardiopulmonary disease.    CT head wo contrast 7/4/2024  Impression: No acute intracranial pathology. In particular, no intracranial hemorrhage or calvarial fracture.       Incidental Findings:   See incidental findings note   I reviewed the above mentioned incidental findings with the patient and/or family and they expressed understanding.    Test Results Pending at Discharge (will require follow up):   None      Outpatient Tests Requested:  None    Complications:  None     Reason for Admission: Chest pain     Hospital Course:   Vani Tay is a 63 y.o. female patient PMH diabetes, CAD, hypertension, GERD, who originally presented to the hospital on 7/4/2024 due to nonspecific chest pain.  Patient fell 3 days prior to admission, and was  "complaining of left-sided chest discomfort that radiated to her shoulder and left arm.  Cardiac workup grossly benign with negative trops, no EKG changes, and echo with a EF 55%, and normal systolic and diastolic function.  Patient's presentation most consistent with musculoskeletal pain.  Left x-ray of the shoulder without any osseous abnormalities.  During hospitalization, patient's lisinopril was discontinued for labile blood pressures.  She is stable for discharge at this time.  She should follow-up with her PCP in 1 to 2 weeks.  She was notified to call her cardiologist to make an appointment in the near future.    Please see above list of diagnoses and related plan for additional information.     Condition at Discharge: stable    Discharge Day Visit / Exam:   Subjective: Seen and examined with son at bedside.  No acute events overnight.  Patient complaining of left trapezius muscle pain with palpation.  Eating and drinking without difficulty.  Urinating and having BM as normal.  Vitals: Blood Pressure: 103/61 (07/05/24 1534)  Pulse: 79 (07/05/24 1534)  Temperature: 98.3 °F (36.8 °C) (07/05/24 1534)  Temp Source: Oral (07/05/24 0748)  Respirations: 16 (07/05/24 1534)  Height: 5' 2\" (157.5 cm) (07/05/24 1400)  Weight - Scale: 60.5 kg (133 lb 6.1 oz) (07/05/24 1400)  SpO2: 95 % (07/05/24 1534)  Exam:   Physical Exam  Constitutional:       General: She is not in acute distress.  HENT:      Mouth/Throat:      Mouth: Mucous membranes are moist.   Eyes:      Conjunctiva/sclera: Conjunctivae normal.   Cardiovascular:      Heart sounds: Normal heart sounds.   Pulmonary:      Breath sounds: Normal breath sounds.   Abdominal:      Palpations: Abdomen is soft.   Musculoskeletal:         General: Tenderness (reproducible pain to L trapezius muscle) present.      Right lower leg: No edema.      Left lower leg: No edema.   Skin:     General: Skin is warm and dry.   Neurological:      Mental Status: Mental status is at " baseline.          Discussion with Family: Updated  (son) at bedside.    Discharge instructions/Information to patient and family:   See after visit summary for information provided to patient and family.      Provisions for Follow-Up Care:  See after visit summary for information related to follow-up care and any pertinent home health orders.      Mobility at time of Discharge:   Basic Mobility Inpatient Raw Score: 19  JH-HLM Goal: 6: Walk 10 steps or more  JH-HLM Achieved: 6: Walk 10 steps or more  HLM Goal achieved. Continue to encourage appropriate mobility.     Disposition:   Home    Planned Readmission: None      Discharge Statement:  I spent 55 minutes discharging the patient. This time was spent on the day of discharge. I had direct contact with the patient on the day of discharge. Greater than 50% of the total time was spent examining patient, answering all patient questions, arranging and discussing plan of care with patient as well as directly providing post-discharge instructions.  Additional time then spent on discharge activities.    Discharge Medications:  See after visit summary for reconciled discharge medications provided to patient and/or family.      **Please Note: This note may have been constructed using a voice recognition system**

## 2024-07-05 NOTE — DISCHARGE INSTR - AVS FIRST PAGE
Dear Vani Tay,     It was our pleasure to care for you here at [facility].  It is our hope that we were always able to meet and exceed the expected standards for your care during your stay.  You were hospitalized due to non-specific chest pain .  You were cared for on the 5th floor under the service of Hope Ann PA-C with the [facility] who covers for your primary care physician (PCP), ARON Diaz, while you were hospitalized.  If you have any questions or concerns related to this hospitalization, you may contact us at .  For follow up, we recommend that you follow up with your primary care physician.  Please review this entire discharge summary as additional general instructions may be provided later as well.  However, at this time we provide for you here, the most important instructions / recommendations at discharge:     Your lisinopril has been discontinued for low blood pressures.  Please do not restart this medication until you follow-up with your primary care doctor.  I have sent Robaxin to your pharmacy.  Please take this up to 4 times daily as needed for muscle spasms.  I recommend you alternate between ibuprofen, and Tylenol for pain control.  You can additionally ice/use heat on the area.  I have also sent a prescription for outpatient physical therapy.  Please follow-up with your primary care doctor in 1 to 2 weeks.  Please call your cardiology office to make an appointment as soon as possible.  Please follow-up CT findings with your primary care doctor.    Sincerely,     Hope Ann PA-C

## 2024-07-05 NOTE — ASSESSMENT & PLAN NOTE
63-year-old female with PMH of CAD s/p drug-eluting stent in multiple vessels, presented to ED with complaint of left-sided chest pain, left shoulder pain, and left arm pain  Admits to mechanical fall 3 days prior striking her L side however states she experienced a similar type of pain prior to fall  Initial EKG does not reveal any ST wave elevation or depression.  Troponin x 2 negative.  On exam, patient has left-sided chest wall tenderness, left shoulder tenderness, and worsening pain with left shoulder movement.  CT chest abdomen pelvis is negative for acute finding.  L shoulder xray: No acute osseous abnormality.   Placed on telemetry monitor  Repeat echo: EF 55%, systolic function normal, diastolic function normal for age.  Most likely musculoskeletal pain.  Lidocaine patch, Tylenol  Ambulatory referral to PT

## 2024-07-05 NOTE — ASSESSMENT & PLAN NOTE
Resume home medication metoprolol and isosorbide.    Continue holding lisinopril on discharge.  Follow-up with PCP in 1 to 2 weeks for further instruction on this medication

## 2024-07-05 NOTE — ASSESSMENT & PLAN NOTE
Follows up with . [facility] cardiology is Dr. Marin.    Continue aspirin, statin, metoprolol, Plavix

## 2024-07-08 NOTE — UTILIZATION REVIEW
NOTIFICATION OF ADMISSION DISCHARGE   This is a Notification of Discharge from [facility]. Please be advised that this patient has been discharge from our facility. Below you will find the admission and discharge date and time including the patient’s disposition.   UTILIZATION REVIEW CONTACT:  Jen Rizvi  Utilization   Network Utilization Review Department  Phone: [phone] x carefully listen to the prompts. All voicemails are confidential.  Email: [email or web address]     ADMISSION INFORMATION  PRESENTATION DATE: 7/4/2024  1:12 PM  OBERVATION ADMISSION DATE: 07/04/2024 1633  INPATIENT ADMISSION DATE: N/A N/A   DISCHARGE DATE: 7/5/2024  6:07 PM   DISPOSITION:Home/Self Care    Network Utilization Review Department  ATTENTION: Please call with any questions or concerns to [phone] and carefully listen to the prompts so that you are directed to the right person. All voicemails are confidential.   For Discharge needs, contact Care Management DC Support Team at [phone] opt. 2  Send all requests for admission clinical reviews, approved or denied determinations and any other requests to dedicated fax number below belonging to the campus where the patient is receiving treatment. List of dedicated fax numbers for the Facilities:  FACILITY NAME UR FAX NUMBER   ADMISSION DENIALS (Administrative/Medical Necessity) [phone]   DISCHARGE SUPPORT TEAM ([facility]) [phone]   PARENT CHILD HEALTH (Maternity/NICU/Pediatrics) [phone]   [facility] [phone]   [facility] [phone]   Count includes the [facility] [phone]   [facility] [phone]   [facility] [phone]   [facility] [phone]   [facility] [phone]   [facility] [phone]    [facility] [phone]   [facility] [phone]   [facility] [phone]   [facility] [phone]

## (undated) DEVICE — GUIDEWIRE .035 260CM 3MM J TIP

## (undated) DEVICE — GLIDESHEATH SLENDER STAINLESS STEEL KIT: Brand: GLIDESHEATH SLENDER

## (undated) DEVICE — RADIFOCUS OPTITORQUE ANGIOGRAPHIC CATHETER: Brand: OPTITORQUE

## (undated) DEVICE — CATH GUIDE LAUNCHER 6FR EBU 3.0

## (undated) DEVICE — TR BAND RADIAL ARTERY COMPRESSION DEVICE: Brand: TR BAND

## (undated) DEVICE — GUIDEWIRE WHOLEY HI TORQUE INTERM MOD J .035 145CM